# Patient Record
Sex: MALE | Race: OTHER | HISPANIC OR LATINO
[De-identification: names, ages, dates, MRNs, and addresses within clinical notes are randomized per-mention and may not be internally consistent; named-entity substitution may affect disease eponyms.]

---

## 2018-05-04 PROBLEM — Z00.00 ENCOUNTER FOR PREVENTIVE HEALTH EXAMINATION: Status: ACTIVE | Noted: 2018-05-04

## 2018-05-21 ENCOUNTER — TRANSCRIPTION ENCOUNTER (OUTPATIENT)
Age: 53
End: 2018-05-21

## 2018-05-21 ENCOUNTER — APPOINTMENT (OUTPATIENT)
Dept: ORTHOPEDIC SURGERY | Facility: CLINIC | Age: 53
End: 2018-05-21
Payer: COMMERCIAL

## 2018-05-21 VITALS — WEIGHT: 285 LBS | BODY MASS INDEX: 36.57 KG/M2 | HEIGHT: 74 IN

## 2018-05-21 DIAGNOSIS — Z87.891 PERSONAL HISTORY OF NICOTINE DEPENDENCE: ICD-10-CM

## 2018-05-21 DIAGNOSIS — M25.561 PAIN IN RIGHT KNEE: ICD-10-CM

## 2018-05-21 DIAGNOSIS — M21.162 VARUS DEFORMITY, NOT ELSEWHERE CLASSIFIED, LEFT KNEE: ICD-10-CM

## 2018-05-21 DIAGNOSIS — Z82.61 FAMILY HISTORY OF ARTHRITIS: ICD-10-CM

## 2018-05-21 DIAGNOSIS — Z86.79 PERSONAL HISTORY OF OTHER DISEASES OF THE CIRCULATORY SYSTEM: ICD-10-CM

## 2018-05-21 DIAGNOSIS — M25.562 PAIN IN RIGHT KNEE: ICD-10-CM

## 2018-05-21 DIAGNOSIS — Z80.9 FAMILY HISTORY OF MALIGNANT NEOPLASM, UNSPECIFIED: ICD-10-CM

## 2018-05-21 DIAGNOSIS — Z78.9 OTHER SPECIFIED HEALTH STATUS: ICD-10-CM

## 2018-05-21 DIAGNOSIS — M21.161 VARUS DEFORMITY, NOT ELSEWHERE CLASSIFIED, RIGHT KNEE: ICD-10-CM

## 2018-05-21 DIAGNOSIS — Z82.62 FAMILY HISTORY OF OSTEOPOROSIS: ICD-10-CM

## 2018-05-21 PROCEDURE — 99204 OFFICE O/P NEW MOD 45 MIN: CPT

## 2018-05-21 PROCEDURE — 73564 X-RAY EXAM KNEE 4 OR MORE: CPT | Mod: RT

## 2018-05-21 RX ORDER — TADALAFIL 5 MG/1
5 TABLET, FILM COATED ORAL
Refills: 0 | Status: ACTIVE | COMMUNITY

## 2018-05-21 RX ORDER — OMEPRAZOLE 10 MG/1
10 CAPSULE, DELAYED RELEASE ORAL
Refills: 0 | Status: ACTIVE | COMMUNITY

## 2018-05-21 RX ORDER — MELOXICAM 15 MG/1
15 TABLET ORAL
Refills: 0 | Status: ACTIVE | COMMUNITY

## 2018-05-21 RX ORDER — MV-MIN/FOLIC/K1/LYCOPEN/LUTEIN 300-60 MCG
TABLET ORAL
Refills: 0 | Status: ACTIVE | COMMUNITY

## 2018-05-21 RX ORDER — LISINOPRIL 10 MG/1
10 TABLET ORAL
Refills: 0 | Status: ACTIVE | COMMUNITY

## 2018-06-08 ENCOUNTER — RX RENEWAL (OUTPATIENT)
Age: 53
End: 2018-06-08

## 2018-06-08 RX ORDER — CELECOXIB 200 MG/1
200 CAPSULE ORAL
Qty: 2 | Refills: 0 | Status: ACTIVE | COMMUNITY
Start: 2018-06-08 | End: 1900-01-01

## 2018-06-21 ENCOUNTER — OUTPATIENT (OUTPATIENT)
Dept: OUTPATIENT SERVICES | Facility: HOSPITAL | Age: 53
LOS: 1 days | End: 2018-06-21
Payer: COMMERCIAL

## 2018-06-21 DIAGNOSIS — Z22.321 CARRIER OR SUSPECTED CARRIER OF METHICILLIN SUSCEPTIBLE STAPHYLOCOCCUS AUREUS: ICD-10-CM

## 2018-06-21 PROCEDURE — 87641 MR-STAPH DNA AMP PROBE: CPT

## 2018-06-22 LAB
MRSA PCR RESULT.: POSITIVE
S AUREUS DNA NOSE QL NAA+PROBE: POSITIVE

## 2018-06-27 ENCOUNTER — OUTPATIENT (OUTPATIENT)
Dept: OUTPATIENT SERVICES | Facility: HOSPITAL | Age: 53
LOS: 1 days | End: 2018-06-27
Payer: COMMERCIAL

## 2018-06-27 ENCOUNTER — APPOINTMENT (OUTPATIENT)
Dept: SURGERY | Facility: CLINIC | Age: 53
End: 2018-06-27

## 2018-06-27 VITALS
HEIGHT: 74 IN | HEART RATE: 76 BPM | OXYGEN SATURATION: 98 % | DIASTOLIC BLOOD PRESSURE: 95 MMHG | SYSTOLIC BLOOD PRESSURE: 138 MMHG | WEIGHT: 295.86 LBS | TEMPERATURE: 97 F | RESPIRATION RATE: 16 BRPM

## 2018-06-27 DIAGNOSIS — M21.161 VARUS DEFORMITY, NOT ELSEWHERE CLASSIFIED, RIGHT KNEE: ICD-10-CM

## 2018-06-27 DIAGNOSIS — M17.10 UNILATERAL PRIMARY OSTEOARTHRITIS, UNSPECIFIED KNEE: ICD-10-CM

## 2018-06-27 DIAGNOSIS — M21.162 VARUS DEFORMITY, NOT ELSEWHERE CLASSIFIED, LEFT KNEE: ICD-10-CM

## 2018-06-27 DIAGNOSIS — Z01.818 ENCOUNTER FOR OTHER PREPROCEDURAL EXAMINATION: ICD-10-CM

## 2018-06-27 DIAGNOSIS — K08.409 PARTIAL LOSS OF TEETH, UNSPECIFIED CAUSE, UNSPECIFIED CLASS: Chronic | ICD-10-CM

## 2018-06-27 DIAGNOSIS — Z98.890 OTHER SPECIFIED POSTPROCEDURAL STATES: Chronic | ICD-10-CM

## 2018-06-27 LAB
ALBUMIN SERPL ELPH-MCNC: 4.7 G/DL — SIGNIFICANT CHANGE UP (ref 3.3–5)
ALP SERPL-CCNC: 68 U/L — SIGNIFICANT CHANGE UP (ref 40–120)
ALT FLD-CCNC: 25 U/L — SIGNIFICANT CHANGE UP (ref 10–45)
ANION GAP SERPL CALC-SCNC: 10 MMOL/L — SIGNIFICANT CHANGE UP (ref 5–17)
APPEARANCE UR: CLEAR — SIGNIFICANT CHANGE UP
APTT BLD: 32.4 SEC — SIGNIFICANT CHANGE UP (ref 27.5–37.4)
AST SERPL-CCNC: 20 U/L — SIGNIFICANT CHANGE UP (ref 10–40)
BILIRUB SERPL-MCNC: 0.4 MG/DL — SIGNIFICANT CHANGE UP (ref 0.2–1.2)
BILIRUB UR-MCNC: NEGATIVE — SIGNIFICANT CHANGE UP
BUN SERPL-MCNC: 18 MG/DL — SIGNIFICANT CHANGE UP (ref 7–23)
CALCIUM SERPL-MCNC: 10.2 MG/DL — SIGNIFICANT CHANGE UP (ref 8.4–10.5)
CHLORIDE SERPL-SCNC: 101 MMOL/L — SIGNIFICANT CHANGE UP (ref 96–108)
CO2 SERPL-SCNC: 25 MMOL/L — SIGNIFICANT CHANGE UP (ref 22–31)
COLOR SPEC: YELLOW — SIGNIFICANT CHANGE UP
CREAT SERPL-MCNC: 0.85 MG/DL — SIGNIFICANT CHANGE UP (ref 0.5–1.3)
DIFF PNL FLD: NEGATIVE — SIGNIFICANT CHANGE UP
GLUCOSE SERPL-MCNC: 82 MG/DL — SIGNIFICANT CHANGE UP (ref 70–99)
GLUCOSE UR QL: NEGATIVE — SIGNIFICANT CHANGE UP
HCT VFR BLD CALC: 43.9 % — SIGNIFICANT CHANGE UP (ref 39–50)
HGB BLD-MCNC: 14.3 G/DL — SIGNIFICANT CHANGE UP (ref 13–17)
INR BLD: 1 — SIGNIFICANT CHANGE UP (ref 0.88–1.16)
KETONES UR-MCNC: NEGATIVE — SIGNIFICANT CHANGE UP
LEUKOCYTE ESTERASE UR-ACNC: NEGATIVE — SIGNIFICANT CHANGE UP
MCHC RBC-ENTMCNC: 27.4 PG — SIGNIFICANT CHANGE UP (ref 27–34)
MCHC RBC-ENTMCNC: 32.6 G/DL — SIGNIFICANT CHANGE UP (ref 32–36)
MCV RBC AUTO: 84.1 FL — SIGNIFICANT CHANGE UP (ref 80–100)
NITRITE UR-MCNC: NEGATIVE — SIGNIFICANT CHANGE UP
PH UR: 5.5 — SIGNIFICANT CHANGE UP (ref 5–8)
PLATELET # BLD AUTO: 255 K/UL — SIGNIFICANT CHANGE UP (ref 150–400)
POTASSIUM SERPL-MCNC: 4.5 MMOL/L — SIGNIFICANT CHANGE UP (ref 3.5–5.3)
POTASSIUM SERPL-SCNC: 4.5 MMOL/L — SIGNIFICANT CHANGE UP (ref 3.5–5.3)
PROT SERPL-MCNC: 7.7 G/DL — SIGNIFICANT CHANGE UP (ref 6–8.3)
PROT UR-MCNC: NEGATIVE MG/DL — SIGNIFICANT CHANGE UP
PROTHROM AB SERPL-ACNC: 11.1 SEC — SIGNIFICANT CHANGE UP (ref 9.8–12.7)
RBC # BLD: 5.22 M/UL — SIGNIFICANT CHANGE UP (ref 4.2–5.8)
RBC # FLD: 15.5 % — SIGNIFICANT CHANGE UP (ref 10.3–16.9)
SODIUM SERPL-SCNC: 136 MMOL/L — SIGNIFICANT CHANGE UP (ref 135–145)
SP GR SPEC: 1.02 — SIGNIFICANT CHANGE UP (ref 1–1.03)
UROBILINOGEN FLD QL: 0.2 E.U./DL — SIGNIFICANT CHANGE UP
WBC # BLD: 6.4 K/UL — SIGNIFICANT CHANGE UP (ref 3.8–10.5)
WBC # FLD AUTO: 6.4 K/UL — SIGNIFICANT CHANGE UP (ref 3.8–10.5)

## 2018-06-27 PROCEDURE — 93010 ELECTROCARDIOGRAM REPORT: CPT

## 2018-06-27 PROCEDURE — 71046 X-RAY EXAM CHEST 2 VIEWS: CPT | Mod: 26

## 2018-06-27 NOTE — H&P PST ADULT - PSH
History of elbow surgery  right  History of inguinal hernia repair, bilateral  In childhood  [place] teeth extracted

## 2018-06-27 NOTE — H&P PST ADULT - HISTORY OF PRESENT ILLNESS
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  Reports pain and swelling has gotten worse recently. Has had to resort to higher doses of NSAIDs  with resultant arterial hypertension.

## 2018-06-27 NOTE — H&P PST ADULT - FAMILY HISTORY
Sibling  Still living? Yes, Estimated age: Age Unknown  Type 2 diabetes mellitus, Age at diagnosis: Age Unknown     Mother  Still living? Yes, Estimated age: Age Unknown  Hypercholesterolemia, Age at diagnosis: Age Unknown

## 2018-06-27 NOTE — ASU PATIENT PROFILE, ADULT - PSH
History of elbow surgery  right  History of inguinal hernia repair, bilateral  In childhood  [place] teeth extracted

## 2018-06-28 LAB
CULTURE RESULTS: NO GROWTH — SIGNIFICANT CHANGE UP
SPECIMEN SOURCE: SIGNIFICANT CHANGE UP

## 2018-07-08 RX ORDER — ACETAMINOPHEN 500 MG
975 TABLET ORAL EVERY 8 HOURS
Qty: 0 | Refills: 0 | Status: DISCONTINUED | OUTPATIENT
Start: 2018-07-10 | End: 2018-07-13

## 2018-07-08 RX ORDER — SODIUM CHLORIDE 9 MG/ML
1000 INJECTION, SOLUTION INTRAVENOUS
Qty: 0 | Refills: 0 | Status: DISCONTINUED | OUTPATIENT
Start: 2018-07-10 | End: 2018-07-12

## 2018-07-08 RX ORDER — SENNA PLUS 8.6 MG/1
2 TABLET ORAL AT BEDTIME
Qty: 0 | Refills: 0 | Status: DISCONTINUED | OUTPATIENT
Start: 2018-07-10 | End: 2018-07-11

## 2018-07-08 RX ORDER — OXYCODONE HYDROCHLORIDE 5 MG/1
10 TABLET ORAL EVERY 4 HOURS
Qty: 0 | Refills: 0 | Status: DISCONTINUED | OUTPATIENT
Start: 2018-07-10 | End: 2018-07-13

## 2018-07-08 RX ORDER — ENOXAPARIN SODIUM 100 MG/ML
40 INJECTION SUBCUTANEOUS EVERY 24 HOURS
Qty: 0 | Refills: 0 | Status: DISCONTINUED | OUTPATIENT
Start: 2018-07-11 | End: 2018-07-12

## 2018-07-08 RX ORDER — ONDANSETRON 8 MG/1
4 TABLET, FILM COATED ORAL EVERY 6 HOURS
Qty: 0 | Refills: 0 | Status: DISCONTINUED | OUTPATIENT
Start: 2018-07-10 | End: 2018-07-13

## 2018-07-08 RX ORDER — OXYCODONE HYDROCHLORIDE 5 MG/1
5 TABLET ORAL EVERY 4 HOURS
Qty: 0 | Refills: 0 | Status: DISCONTINUED | OUTPATIENT
Start: 2018-07-10 | End: 2018-07-13

## 2018-07-08 RX ORDER — MAGNESIUM HYDROXIDE 400 MG/1
30 TABLET, CHEWABLE ORAL DAILY
Qty: 0 | Refills: 0 | Status: DISCONTINUED | OUTPATIENT
Start: 2018-07-10 | End: 2018-07-13

## 2018-07-08 RX ORDER — HYDROMORPHONE HYDROCHLORIDE 2 MG/ML
0.5 INJECTION INTRAMUSCULAR; INTRAVENOUS; SUBCUTANEOUS EVERY 4 HOURS
Qty: 0 | Refills: 0 | Status: DISCONTINUED | OUTPATIENT
Start: 2018-07-10 | End: 2018-07-12

## 2018-07-08 RX ORDER — DOCUSATE SODIUM 100 MG
100 CAPSULE ORAL THREE TIMES A DAY
Qty: 0 | Refills: 0 | Status: DISCONTINUED | OUTPATIENT
Start: 2018-07-10 | End: 2018-07-13

## 2018-07-08 RX ORDER — POLYETHYLENE GLYCOL 3350 17 G/17G
17 POWDER, FOR SOLUTION ORAL DAILY
Qty: 0 | Refills: 0 | Status: DISCONTINUED | OUTPATIENT
Start: 2018-07-10 | End: 2018-07-13

## 2018-07-08 NOTE — H&P ADULT - NSHPPHYSICALEXAM_GEN_ALL_CORE
Left Knee 5 degrees varus , medial joint line tenderness, Right knee 5 degrees varus, medial joint line tenderness.

## 2018-07-08 NOTE — H&P ADULT - PSH
History of elbow surgery  right  History of inguinal hernia repair, bilateral  In childhood  [place] teeth extracted

## 2018-07-09 VITALS
SYSTOLIC BLOOD PRESSURE: 155 MMHG | OXYGEN SATURATION: 98 % | HEIGHT: 74 IN | DIASTOLIC BLOOD PRESSURE: 87 MMHG | TEMPERATURE: 97 F | RESPIRATION RATE: 18 BRPM | HEART RATE: 85 BPM | WEIGHT: 286.6 LBS

## 2018-07-09 RX ORDER — AMLODIPINE BESYLATE 2.5 MG/1
5 TABLET ORAL DAILY
Qty: 0 | Refills: 0 | Status: DISCONTINUED | OUTPATIENT
Start: 2018-07-10 | End: 2018-07-12

## 2018-07-09 RX ORDER — HYDROMORPHONE HYDROCHLORIDE 2 MG/ML
0.5 INJECTION INTRAMUSCULAR; INTRAVENOUS; SUBCUTANEOUS
Qty: 0 | Refills: 0 | Status: DISCONTINUED | OUTPATIENT
Start: 2018-07-10 | End: 2018-07-10

## 2018-07-09 RX ORDER — OXYCODONE HYDROCHLORIDE 5 MG/1
20 TABLET ORAL EVERY 12 HOURS
Qty: 0 | Refills: 0 | Status: DISCONTINUED | OUTPATIENT
Start: 2018-07-10 | End: 2018-07-11

## 2018-07-09 RX ORDER — LISINOPRIL 2.5 MG/1
20 TABLET ORAL DAILY
Qty: 0 | Refills: 0 | Status: DISCONTINUED | OUTPATIENT
Start: 2018-07-10 | End: 2018-07-12

## 2018-07-09 RX ORDER — PANTOPRAZOLE SODIUM 20 MG/1
40 TABLET, DELAYED RELEASE ORAL
Qty: 0 | Refills: 0 | Status: DISCONTINUED | OUTPATIENT
Start: 2018-07-10 | End: 2018-07-12

## 2018-07-09 NOTE — PATIENT PROFILE ADULT. - PSH
History of elbow surgery  right  History of inguinal hernia repair, bilateral  In childhood  [place] teeth extracted

## 2018-07-10 ENCOUNTER — APPOINTMENT (OUTPATIENT)
Dept: ORTHOPEDIC SURGERY | Facility: HOSPITAL | Age: 53
End: 2018-07-10

## 2018-07-10 ENCOUNTER — TRANSCRIPTION ENCOUNTER (OUTPATIENT)
Age: 53
End: 2018-07-10

## 2018-07-10 ENCOUNTER — INPATIENT (INPATIENT)
Facility: HOSPITAL | Age: 53
LOS: 6 days | Discharge: NURSING, REHAB OR LONG-TERM CARE | DRG: 461 | End: 2018-07-17
Attending: ORTHOPAEDIC SURGERY | Admitting: ORTHOPAEDIC SURGERY
Payer: COMMERCIAL

## 2018-07-10 ENCOUNTER — RESULT REVIEW (OUTPATIENT)
Age: 53
End: 2018-07-10

## 2018-07-10 DIAGNOSIS — M17.0 BILATERAL PRIMARY OSTEOARTHRITIS OF KNEE: ICD-10-CM

## 2018-07-10 DIAGNOSIS — K44.9 DIAPHRAGMATIC HERNIA WITHOUT OBSTRUCTION OR GANGRENE: ICD-10-CM

## 2018-07-10 DIAGNOSIS — M21.161 VARUS DEFORMITY, NOT ELSEWHERE CLASSIFIED, RIGHT KNEE: ICD-10-CM

## 2018-07-10 DIAGNOSIS — Z98.890 OTHER SPECIFIED POSTPROCEDURAL STATES: Chronic | ICD-10-CM

## 2018-07-10 DIAGNOSIS — K21.9 GASTRO-ESOPHAGEAL REFLUX DISEASE WITHOUT ESOPHAGITIS: ICD-10-CM

## 2018-07-10 DIAGNOSIS — E78.01 FAMILIAL HYPERCHOLESTEROLEMIA: ICD-10-CM

## 2018-07-10 DIAGNOSIS — K08.409 PARTIAL LOSS OF TEETH, UNSPECIFIED CAUSE, UNSPECIFIED CLASS: Chronic | ICD-10-CM

## 2018-07-10 DIAGNOSIS — F41.1 GENERALIZED ANXIETY DISORDER: ICD-10-CM

## 2018-07-10 DIAGNOSIS — I10 ESSENTIAL (PRIMARY) HYPERTENSION: ICD-10-CM

## 2018-07-10 LAB
ANION GAP SERPL CALC-SCNC: 11 MMOL/L — SIGNIFICANT CHANGE UP (ref 5–17)
BASOPHILS NFR BLD AUTO: 0.4 % — SIGNIFICANT CHANGE UP (ref 0–2)
BUN SERPL-MCNC: 19 MG/DL — SIGNIFICANT CHANGE UP (ref 7–23)
CALCIUM SERPL-MCNC: 8.6 MG/DL — SIGNIFICANT CHANGE UP (ref 8.4–10.5)
CHLORIDE SERPL-SCNC: 105 MMOL/L — SIGNIFICANT CHANGE UP (ref 96–108)
CO2 SERPL-SCNC: 24 MMOL/L — SIGNIFICANT CHANGE UP (ref 22–31)
CREAT SERPL-MCNC: 0.78 MG/DL — SIGNIFICANT CHANGE UP (ref 0.5–1.3)
EOSINOPHIL NFR BLD AUTO: 0.4 % — SIGNIFICANT CHANGE UP (ref 0–6)
GLUCOSE SERPL-MCNC: 123 MG/DL — HIGH (ref 70–99)
HCT VFR BLD CALC: 39.2 % — SIGNIFICANT CHANGE UP (ref 39–50)
HGB BLD-MCNC: 12.5 G/DL — LOW (ref 13–17)
LYMPHOCYTES # BLD AUTO: 9.7 % — LOW (ref 13–44)
MCHC RBC-ENTMCNC: 27.6 PG — SIGNIFICANT CHANGE UP (ref 27–34)
MCHC RBC-ENTMCNC: 31.9 G/DL — LOW (ref 32–36)
MCV RBC AUTO: 86.5 FL — SIGNIFICANT CHANGE UP (ref 80–100)
MONOCYTES NFR BLD AUTO: 1.6 % — LOW (ref 2–14)
NEUTROPHILS NFR BLD AUTO: 87.9 % — HIGH (ref 43–77)
PLATELET # BLD AUTO: 214 K/UL — SIGNIFICANT CHANGE UP (ref 150–400)
POTASSIUM SERPL-MCNC: 4.6 MMOL/L — SIGNIFICANT CHANGE UP (ref 3.5–5.3)
POTASSIUM SERPL-SCNC: 4.6 MMOL/L — SIGNIFICANT CHANGE UP (ref 3.5–5.3)
RBC # BLD: 4.53 M/UL — SIGNIFICANT CHANGE UP (ref 4.2–5.8)
RBC # FLD: 15.2 % — SIGNIFICANT CHANGE UP (ref 10.3–16.9)
SODIUM SERPL-SCNC: 140 MMOL/L — SIGNIFICANT CHANGE UP (ref 135–145)
WBC # BLD: 7.5 K/UL — SIGNIFICANT CHANGE UP (ref 3.8–10.5)
WBC # FLD AUTO: 7.5 K/UL — SIGNIFICANT CHANGE UP (ref 3.8–10.5)

## 2018-07-10 PROCEDURE — 73560 X-RAY EXAM OF KNEE 1 OR 2: CPT | Mod: 26,50

## 2018-07-10 PROCEDURE — 27447 TOTAL KNEE ARTHROPLASTY: CPT | Mod: 50

## 2018-07-10 RX ORDER — ZINC SULFATE TAB 220 MG (50 MG ZINC EQUIVALENT) 220 (50 ZN) MG
15 TAB ORAL
Qty: 0 | Refills: 0 | COMMUNITY

## 2018-07-10 RX ORDER — LISINOPRIL 2.5 MG/1
1 TABLET ORAL
Qty: 0 | Refills: 0 | COMMUNITY

## 2018-07-10 RX ORDER — MULTIVIT-MIN/FERROUS GLUCONATE 9 MG/15 ML
1 LIQUID (ML) ORAL
Qty: 0 | Refills: 0 | COMMUNITY

## 2018-07-10 RX ORDER — CEFAZOLIN SODIUM 1 G
2000 VIAL (EA) INJECTION EVERY 8 HOURS
Qty: 0 | Refills: 0 | Status: COMPLETED | OUTPATIENT
Start: 2018-07-10 | End: 2018-07-11

## 2018-07-10 RX ORDER — BUPIVACAINE 13.3 MG/ML
20 INJECTION, SUSPENSION, LIPOSOMAL INFILTRATION ONCE
Qty: 0 | Refills: 0 | Status: DISCONTINUED | OUTPATIENT
Start: 2018-07-10 | End: 2018-07-17

## 2018-07-10 RX ORDER — CHOLECALCIFEROL (VITAMIN D3) 125 MCG
1 CAPSULE ORAL
Qty: 0 | Refills: 0 | COMMUNITY

## 2018-07-10 RX ORDER — AMLODIPINE BESYLATE 2.5 MG/1
1 TABLET ORAL
Qty: 0 | Refills: 0 | COMMUNITY

## 2018-07-10 RX ADMIN — PANTOPRAZOLE SODIUM 40 MILLIGRAM(S): 20 TABLET, DELAYED RELEASE ORAL at 21:11

## 2018-07-10 RX ADMIN — HYDROMORPHONE HYDROCHLORIDE 0.5 MILLIGRAM(S): 2 INJECTION INTRAMUSCULAR; INTRAVENOUS; SUBCUTANEOUS at 17:30

## 2018-07-10 RX ADMIN — Medication 100 MILLIGRAM(S): at 23:09

## 2018-07-10 RX ADMIN — HYDROMORPHONE HYDROCHLORIDE 0.5 MILLIGRAM(S): 2 INJECTION INTRAMUSCULAR; INTRAVENOUS; SUBCUTANEOUS at 21:00

## 2018-07-10 RX ADMIN — Medication 975 MILLIGRAM(S): at 23:09

## 2018-07-10 RX ADMIN — HYDROMORPHONE HYDROCHLORIDE 0.5 MILLIGRAM(S): 2 INJECTION INTRAMUSCULAR; INTRAVENOUS; SUBCUTANEOUS at 19:09

## 2018-07-10 RX ADMIN — OXYCODONE HYDROCHLORIDE 20 MILLIGRAM(S): 5 TABLET ORAL at 18:41

## 2018-07-10 RX ADMIN — HYDROMORPHONE HYDROCHLORIDE 0.5 MILLIGRAM(S): 2 INJECTION INTRAMUSCULAR; INTRAVENOUS; SUBCUTANEOUS at 19:24

## 2018-07-10 RX ADMIN — HYDROMORPHONE HYDROCHLORIDE 0.5 MILLIGRAM(S): 2 INJECTION INTRAMUSCULAR; INTRAVENOUS; SUBCUTANEOUS at 18:05

## 2018-07-10 RX ADMIN — HYDROMORPHONE HYDROCHLORIDE 0.5 MILLIGRAM(S): 2 INJECTION INTRAMUSCULAR; INTRAVENOUS; SUBCUTANEOUS at 17:50

## 2018-07-10 RX ADMIN — OXYCODONE HYDROCHLORIDE 20 MILLIGRAM(S): 5 TABLET ORAL at 19:26

## 2018-07-10 RX ADMIN — HYDROMORPHONE HYDROCHLORIDE 0.5 MILLIGRAM(S): 2 INJECTION INTRAMUSCULAR; INTRAVENOUS; SUBCUTANEOUS at 17:15

## 2018-07-10 RX ADMIN — Medication 975 MILLIGRAM(S): at 21:11

## 2018-07-10 RX ADMIN — Medication 100 MILLIGRAM(S): at 18:30

## 2018-07-10 RX ADMIN — HYDROMORPHONE HYDROCHLORIDE 0.5 MILLIGRAM(S): 2 INJECTION INTRAMUSCULAR; INTRAVENOUS; SUBCUTANEOUS at 21:10

## 2018-07-10 NOTE — PHYSICAL THERAPY INITIAL EVALUATION ADULT - IMPAIRED TRANSFERS: SIT/STAND, REHAB EVAL
pain/impaired postural control/impaired balance/impaired motor control/decreased strength/decreased ROM/impaired sensory feedback

## 2018-07-10 NOTE — CONSULT NOTE ADULT - SUBJECTIVE AND OBJECTIVE BOX
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  Reports moderate pain and swelling, decreased ROM and unsteady gait has gotten worse recently. Has had to resort to higher doses of NSAIDs  with resultant arterial hypertension.  X rays confirm diagnosis.  Symptoms worse with stairs and after prolonged immobility and increased over time.    PAST MEDICAL & SURGICAL HISTORY:  Hiatal hernia GERD  HBP hypercholesterolemia   Arthritis: both knees  [place] teeth extracted  History of inguinal hernia repair, bilateral: In childhood  History of elbow surgery: right      REVIEW OF SYSTEMS    General:  normal  Skin/Breast: normal  Ophthalmologic: negative  ENMT:	normal  Respiratory and Thorax: normal  Cardiovascular:	normal  Gastrointestinal:	normal  Genitourinary:	normal  Musculoskeletal:	 bilateral knee swelling   Neurological:	normal  Psychiatric:	normal  Hematology/Lymphatics:	 negative  Endocrine:	negative  Allergic/Immunologic:	negative      MEDICATIONS     lisinopril 20 mg oral tablet: Last Dose Taken:  , 1 tab(s) orally once a day  · 	omeprazole 20 mg oral delayed release capsule: Last Dose Taken:  , 1 cap(s) orally once a day  · 	amLODIPine 5 mg oral tablet: Last Dose Taken:  , 1 tab(s) orally once a day  · 	traMADol 50 mg oral tablet: 1 tab(s) orally every 4 hours  · 	Tylenol 500 mg oral tablet: 2 tab(s) orally every 6 hours  · 	Ferrous DS: Last Dose Taken:  , 65  orally 2 times a day  · 	Centrum Silver oral tablet: Last Dose Taken:  , 1 tab(s) orally once a day  · 	magnesium hydroxide 400 mg oral tablet, chewable: Last Dose Taken:    · 	Calcium 500+D: Last Dose Taken:  , 1000 milligram(s) orally once a day  · 	Zinc: 15 milligram(s) orally once a day  · 	Vitamin D3 2000 intl units oral tablet: 1 tab(s) orally once a day    Allergies    No Known Allergies    SOCIAL HISTORY: no cigs social alcohol    FAMILY HISTORY:  Hypercholesterolemia (Mother): Mother.  Type 2 diabetes mellitus (Sibling): Younger brother. Still living.      PHYSICAL EXAM:    Vital Signs Last 24 Hrs  T(C): --  T(F): --98.6  HR: --72  BP: --120/80  BP(mean): --  RR: --16  SpO2: --    Constitutional: WDWNWM  Eyes: conj pink  ENMT: negative  Neck: supple  Breasts: not examined   Back: negative  Respiratory: clear to P&A  Cardiovascular: no MRGT or H  Gastrointestinal: normal bowel sounds  Genitourinary: neg  Rectal: not examined  Extremities: normal  Vascular: normal  Neurological: normal  Skin: negative  Lymph Nodes: negative  Musculoskeletal:   decreased ROM  bilateral knees  Psychiatric: anxiety

## 2018-07-10 NOTE — PHYSICAL THERAPY INITIAL EVALUATION ADULT - TRANSFER SAFETY CONCERNS NOTED: SIT/STAND, REHAB EVAL
decreased proprioception/losing balance/inability to maintain weight-bearing restrictions w/o assist

## 2018-07-10 NOTE — BRIEF OPERATIVE NOTE - PROCEDURE
<<-----Click on this checkbox to enter Procedure Total knee replacements  07/10/2018  ANJU  Active  AV

## 2018-07-10 NOTE — PHYSICAL THERAPY INITIAL EVALUATION ADULT - ACTIVE RANGE OF MOTION EXAMINATION, REHAB EVAL
bilateral upper extremity Active ROM was WFL (within functional limits)/b/l knees between 0-50 degrees

## 2018-07-10 NOTE — DISCHARGE NOTE ADULT - MEDICATION SUMMARY - MEDICATIONS TO TAKE
I will START or STAY ON the medications listed below when I get home from the hospital:    oxyCODONE 10 mg oral tablet  -- 1 tab(s) by mouth every 3 hours, As needed, Severe Pain (7 - 10)  -- Indication: For severe postop pain    oxyCODONE 5 mg oral tablet  -- 1 tab(s) by mouth every 3 hours, As needed, Moderate Pain (4 - 6)  -- Indication: For Moderate postop pain    acetaminophen 325 mg oral tablet  -- 3 tab(s) by mouth every 8 hours  -- Indication: For Postop pain    lisinopril 20 mg oral tablet  -- 1 tab(s) by mouth once a day  -- Indication: For HTN     enoxaparin  -- 40 mg subcutaneous daily x 3 weeks from date of surgery  -- Indication: For clot prevention    amLODIPine 5 mg oral tablet  -- 1 tab(s) by mouth once a day  -- Indication: For HTN    Ferrous DS  -- 65  by mouth 2 times a day  -- Indication: For Iron supplement    bisacodyl 10 mg rectal suppository  -- 1 suppository(ies) rectally once a day, As needed, Constipation  -- Indication: For Constipation    Zinc  -- 15 milligram(s) by mouth once a day  -- Indication: For vitamin    sucralfate 1 g/10 mL oral suspension  -- 10 milliliter(s) by mouth 4 times a day  -- Indication: For Esophagitis, jennie henry tear, hiatal hernia    pantoprazole 40 mg oral delayed release tablet  -- 1 tab(s) by mouth once a day (before a meal)  -- Indication: For Esophagitis, jennie henry tear, hiatal hernia    Centrum Silver oral tablet  -- 1 tab(s) by mouth once a day  -- Indication: For vitamin    Calcium 500+D  -- 1000 milligram(s) by mouth once a day  -- Indication: For vitamin    Vitamin D3 2000 intl units oral tablet  -- 1 tab(s) by mouth once a day  -- Indication: For vitamin

## 2018-07-10 NOTE — PHYSICAL THERAPY INITIAL EVALUATION ADULT - IMPAIRMENTS CONTRIBUTING TO GAIT DEVIATIONS, PT EVAL
impaired motor control/pain/impaired postural control/impaired balance/decreased ROM/decreased strength/impaired sensory feedback

## 2018-07-10 NOTE — PHYSICAL THERAPY INITIAL EVALUATION ADULT - GAIT DEVIATIONS NOTED, PT EVAL
increased time in double stance/decreased josselyn/decreased step length/decreased weight-shifting ability

## 2018-07-10 NOTE — PHYSICAL THERAPY INITIAL EVALUATION ADULT - GENERAL OBSERVATIONS, REHAB EVAL
Patient received in semi-manuel position, no apparent distress, +intravenous line, +hemo vac x 2, +cryocuff x 2, +sequential pneumatic compression device x 2, +telemetry.

## 2018-07-10 NOTE — DISCHARGE NOTE ADULT - ADDITIONAL INSTRUCTIONS
No strenuous activity, heavy lifting, driving, tub bathing, or returning to work until cleared by MD.  You may shower--dressing is waterproof.  Remove dressing after post op day 10, then leave incision open to air.  Follow up with Dr. Rosas in his office in 3 weeks.  Any staples/sutures will be removed in his office.   If you don't have a bowel movement by post op day 3, then take Milk of Magnesia (over the counter).  If no bowel movement by at least post op day 5, then use a Dulcolax suppository (over the counter) and/or a Fleets enema--if still no bowel movement, call your MD.  Contact your doctor if you experience: fever greater than 101.5, chills, chest pain, difficulty breathing, bleeding, redness or heat around the incision.    Please follow up with your primary care provider. No strenuous activity, heavy lifting, driving, tub bathing, or returning to work until cleared by MD.  You may shower--dressing is waterproof.  Remove dressing after post op day 10, then leave incision open to air.  Follow up with Dr. Rosas in his office in 3 weeks.  Any staples/sutures will be removed in his office.   If you don't have a bowel movement by post op day 3, then take Milk of Magnesia (over the counter).  If no bowel movement by at least post op day 5, then use a Dulcolax suppository (over the counter) and/or a Fleets enema--if still no bowel movement, call your MD.  Contact your doctor if you experience: fever greater than 101.5, chills, chest pain, difficulty breathing, bleeding, redness or heat around the incision.    Please follow up with your primary care provider.  Please follow up with your gastroenterologist to discuss you recent EGD on 7/13/18.  It showed severe esophagitis, large hiatal hernia, and jennie henry tear.  Do not take NSAID (aleve, ibuprofen, naproxen, advil, etc).

## 2018-07-10 NOTE — PHYSICAL THERAPY INITIAL EVALUATION ADULT - CRITERIA FOR SKILLED THERAPEUTIC INTERVENTIONS
risk reduction/prevention/therapy frequency/anticipated discharge recommendation/rehab potential/impairments found/functional limitations in following categories

## 2018-07-10 NOTE — DISCHARGE NOTE ADULT - CARE PLAN
Principal Discharge DX:	Primary osteoarthritis of both knees  Goal:	improvement after surgery  Assessment and plan of treatment:	see below

## 2018-07-10 NOTE — DISCHARGE NOTE ADULT - CARE PROVIDER_API CALL
Danny Rosas (MD), Orthopaedic Surgery  [address]  [address] [place] [address]  Phone: [phone]  Fax: [phone]

## 2018-07-10 NOTE — PROGRESS NOTE ADULT - SUBJECTIVE AND OBJECTIVE BOX
Post op check      Pt seen and examined in PACU.  Pt comfortable.    PE:  aaox3  BL LE sensation intact  motor: BL LE still under effect of spinal  2+ DP pulse BL LE  BL LE warm well perfused    a/p s/p BLM TKA  WBAT  PT  ancef x 24 h  lovenox  reg diet  drain care and record

## 2018-07-10 NOTE — DISCHARGE NOTE ADULT - MEDICATION SUMMARY - MEDICATIONS TO STOP TAKING
I will STOP taking the medications listed below when I get home from the hospital:    omeprazole 20 mg oral delayed release capsule  -- 1 cap(s) by mouth once a day    traMADol 50 mg oral tablet  -- 1 tab(s) by mouth every 4 hours    magnesium hydroxide 400 mg oral tablet, chewable

## 2018-07-10 NOTE — DISCHARGE NOTE ADULT - PATIENT PORTAL LINK FT
You can access the [facility] Patient Portal, offered by [facility], by registering with the following website: [email or web address]

## 2018-07-10 NOTE — DISCHARGE NOTE ADULT - HOSPITAL COURSE
Admitted  Surgery - b/l TKA 7/10/18  Perioperative abx  Pain control  DVT ppx  Med consult  PT and OT consult Admitted  Surgery - b/l TKA 7/10/18  Perioperative abx  Pain control  DVT ppx  Med consult  PT and OT consult  Cardio consult  GI consult and EGD 7/13/18 -  large hiatal hernia, jennie henry tear, severe esophagitis  3 unit prbc tranfusion for acute blood loss 2/2 GI bleed

## 2018-07-11 LAB
ANION GAP SERPL CALC-SCNC: 13 MMOL/L — SIGNIFICANT CHANGE UP (ref 5–17)
ANION GAP SERPL CALC-SCNC: 13 MMOL/L — SIGNIFICANT CHANGE UP (ref 5–17)
APTT BLD: 25.6 SEC — LOW (ref 27.5–37.4)
BASOPHILS NFR BLD AUTO: 0.1 % — SIGNIFICANT CHANGE UP (ref 0–2)
BUN SERPL-MCNC: 28 MG/DL — HIGH (ref 7–23)
BUN SERPL-MCNC: 36 MG/DL — HIGH (ref 7–23)
CALCIUM SERPL-MCNC: 8.9 MG/DL — SIGNIFICANT CHANGE UP (ref 8.4–10.5)
CALCIUM SERPL-MCNC: 9.2 MG/DL — SIGNIFICANT CHANGE UP (ref 8.4–10.5)
CHLORIDE SERPL-SCNC: 100 MMOL/L — SIGNIFICANT CHANGE UP (ref 96–108)
CHLORIDE SERPL-SCNC: 101 MMOL/L — SIGNIFICANT CHANGE UP (ref 96–108)
CK MB CFR SERPL CALC: 2.3 NG/ML — SIGNIFICANT CHANGE UP (ref 0–6.7)
CK SERPL-CCNC: 104 U/L — SIGNIFICANT CHANGE UP (ref 30–200)
CO2 SERPL-SCNC: 24 MMOL/L — SIGNIFICANT CHANGE UP (ref 22–31)
CO2 SERPL-SCNC: 24 MMOL/L — SIGNIFICANT CHANGE UP (ref 22–31)
CREAT SERPL-MCNC: 0.8 MG/DL — SIGNIFICANT CHANGE UP (ref 0.5–1.3)
CREAT SERPL-MCNC: 0.93 MG/DL — SIGNIFICANT CHANGE UP (ref 0.5–1.3)
GLUCOSE BLDC GLUCOMTR-MCNC: 132 MG/DL — HIGH (ref 70–99)
GLUCOSE SERPL-MCNC: 122 MG/DL — HIGH (ref 70–99)
GLUCOSE SERPL-MCNC: 137 MG/DL — HIGH (ref 70–99)
HCT VFR BLD CALC: 35.4 % — LOW (ref 39–50)
HCT VFR BLD CALC: 36.6 % — LOW (ref 39–50)
HGB BLD-MCNC: 11.3 G/DL — LOW (ref 13–17)
HGB BLD-MCNC: 11.4 G/DL — LOW (ref 13–17)
INR BLD: 1.13 — SIGNIFICANT CHANGE UP (ref 0.88–1.16)
LACTATE SERPL-SCNC: 1.9 MMOL/L — SIGNIFICANT CHANGE UP (ref 0.5–2)
LACTATE SERPL-SCNC: 2.8 MMOL/L — HIGH (ref 0.5–2)
LYMPHOCYTES # BLD AUTO: 15.9 % — SIGNIFICANT CHANGE UP (ref 13–44)
MCHC RBC-ENTMCNC: 27.1 PG — SIGNIFICANT CHANGE UP (ref 27–34)
MCHC RBC-ENTMCNC: 27.7 PG — SIGNIFICANT CHANGE UP (ref 27–34)
MCHC RBC-ENTMCNC: 31.1 G/DL — LOW (ref 32–36)
MCHC RBC-ENTMCNC: 31.9 G/DL — LOW (ref 32–36)
MCV RBC AUTO: 86.8 FL — SIGNIFICANT CHANGE UP (ref 80–100)
MCV RBC AUTO: 86.9 FL — SIGNIFICANT CHANGE UP (ref 80–100)
MONOCYTES NFR BLD AUTO: 11.3 % — SIGNIFICANT CHANGE UP (ref 2–14)
NEUTROPHILS NFR BLD AUTO: 72.7 % — SIGNIFICANT CHANGE UP (ref 43–77)
PLATELET # BLD AUTO: 271 K/UL — SIGNIFICANT CHANGE UP (ref 150–400)
PLATELET # BLD AUTO: 321 K/UL — SIGNIFICANT CHANGE UP (ref 150–400)
POTASSIUM SERPL-MCNC: 4.6 MMOL/L — SIGNIFICANT CHANGE UP (ref 3.5–5.3)
POTASSIUM SERPL-MCNC: 4.7 MMOL/L — SIGNIFICANT CHANGE UP (ref 3.5–5.3)
POTASSIUM SERPL-SCNC: 4.6 MMOL/L — SIGNIFICANT CHANGE UP (ref 3.5–5.3)
POTASSIUM SERPL-SCNC: 4.7 MMOL/L — SIGNIFICANT CHANGE UP (ref 3.5–5.3)
PROTHROM AB SERPL-ACNC: 12.6 SEC — SIGNIFICANT CHANGE UP (ref 9.8–12.7)
RBC # BLD: 4.08 M/UL — LOW (ref 4.2–5.8)
RBC # BLD: 4.21 M/UL — SIGNIFICANT CHANGE UP (ref 4.2–5.8)
RBC # FLD: 15.1 % — SIGNIFICANT CHANGE UP (ref 10.3–16.9)
RBC # FLD: 15.4 % — SIGNIFICANT CHANGE UP (ref 10.3–16.9)
SODIUM SERPL-SCNC: 137 MMOL/L — SIGNIFICANT CHANGE UP (ref 135–145)
SODIUM SERPL-SCNC: 138 MMOL/L — SIGNIFICANT CHANGE UP (ref 135–145)
SURGICAL PATHOLOGY STUDY: SIGNIFICANT CHANGE UP
TROPONIN T SERPL-MCNC: <0.01 NG/ML — SIGNIFICANT CHANGE UP (ref 0–0.01)
WBC # BLD: 13 K/UL — HIGH (ref 3.8–10.5)
WBC # BLD: 16 K/UL — HIGH (ref 3.8–10.5)
WBC # FLD AUTO: 13 K/UL — HIGH (ref 3.8–10.5)
WBC # FLD AUTO: 16 K/UL — HIGH (ref 3.8–10.5)

## 2018-07-11 RX ORDER — SODIUM CHLORIDE 9 MG/ML
1000 INJECTION INTRAMUSCULAR; INTRAVENOUS; SUBCUTANEOUS ONCE
Qty: 0 | Refills: 0 | Status: COMPLETED | OUTPATIENT
Start: 2018-07-11 | End: 2018-07-11

## 2018-07-11 RX ORDER — OXYCODONE HYDROCHLORIDE 5 MG/1
10 TABLET ORAL EVERY 12 HOURS
Qty: 0 | Refills: 0 | Status: DISCONTINUED | OUTPATIENT
Start: 2018-07-11 | End: 2018-07-13

## 2018-07-11 RX ORDER — ACETAMINOPHEN 500 MG
1000 TABLET ORAL ONCE
Qty: 0 | Refills: 0 | Status: COMPLETED | OUTPATIENT
Start: 2018-07-11 | End: 2018-07-11

## 2018-07-11 RX ORDER — SENNA PLUS 8.6 MG/1
2 TABLET ORAL AT BEDTIME
Qty: 0 | Refills: 0 | Status: DISCONTINUED | OUTPATIENT
Start: 2018-07-11 | End: 2018-07-13

## 2018-07-11 RX ORDER — CELECOXIB 200 MG/1
200 CAPSULE ORAL ONCE
Qty: 0 | Refills: 0 | Status: COMPLETED | OUTPATIENT
Start: 2018-07-11 | End: 2018-07-11

## 2018-07-11 RX ADMIN — PANTOPRAZOLE SODIUM 40 MILLIGRAM(S): 20 TABLET, DELAYED RELEASE ORAL at 06:10

## 2018-07-11 RX ADMIN — Medication 400 MILLIGRAM(S): at 14:45

## 2018-07-11 RX ADMIN — OXYCODONE HYDROCHLORIDE 10 MILLIGRAM(S): 5 TABLET ORAL at 04:21

## 2018-07-11 RX ADMIN — Medication 100 MILLIGRAM(S): at 22:08

## 2018-07-11 RX ADMIN — Medication 975 MILLIGRAM(S): at 22:08

## 2018-07-11 RX ADMIN — Medication 100 MILLIGRAM(S): at 14:51

## 2018-07-11 RX ADMIN — OXYCODONE HYDROCHLORIDE 10 MILLIGRAM(S): 5 TABLET ORAL at 19:00

## 2018-07-11 RX ADMIN — CELECOXIB 200 MILLIGRAM(S): 200 CAPSULE ORAL at 09:50

## 2018-07-11 RX ADMIN — OXYCODONE HYDROCHLORIDE 20 MILLIGRAM(S): 5 TABLET ORAL at 06:10

## 2018-07-11 RX ADMIN — Medication 1000 MILLIGRAM(S): at 15:45

## 2018-07-11 RX ADMIN — Medication 975 MILLIGRAM(S): at 07:38

## 2018-07-11 RX ADMIN — ENOXAPARIN SODIUM 40 MILLIGRAM(S): 100 INJECTION SUBCUTANEOUS at 06:10

## 2018-07-11 RX ADMIN — AMLODIPINE BESYLATE 5 MILLIGRAM(S): 2.5 TABLET ORAL at 06:10

## 2018-07-11 RX ADMIN — SENNA PLUS 2 TABLET(S): 8.6 TABLET ORAL at 22:08

## 2018-07-11 RX ADMIN — Medication 100 MILLIGRAM(S): at 01:27

## 2018-07-11 RX ADMIN — HYDROMORPHONE HYDROCHLORIDE 0.5 MILLIGRAM(S): 2 INJECTION INTRAMUSCULAR; INTRAVENOUS; SUBCUTANEOUS at 02:15

## 2018-07-11 RX ADMIN — OXYCODONE HYDROCHLORIDE 20 MILLIGRAM(S): 5 TABLET ORAL at 07:39

## 2018-07-11 RX ADMIN — OXYCODONE HYDROCHLORIDE 10 MILLIGRAM(S): 5 TABLET ORAL at 22:08

## 2018-07-11 RX ADMIN — OXYCODONE HYDROCHLORIDE 10 MILLIGRAM(S): 5 TABLET ORAL at 18:07

## 2018-07-11 RX ADMIN — Medication 100 MILLIGRAM(S): at 06:10

## 2018-07-11 RX ADMIN — ONDANSETRON 4 MILLIGRAM(S): 8 TABLET, FILM COATED ORAL at 05:16

## 2018-07-11 RX ADMIN — CELECOXIB 200 MILLIGRAM(S): 200 CAPSULE ORAL at 10:50

## 2018-07-11 RX ADMIN — OXYCODONE HYDROCHLORIDE 10 MILLIGRAM(S): 5 TABLET ORAL at 09:50

## 2018-07-11 RX ADMIN — OXYCODONE HYDROCHLORIDE 10 MILLIGRAM(S): 5 TABLET ORAL at 06:02

## 2018-07-11 RX ADMIN — HYDROMORPHONE HYDROCHLORIDE 0.5 MILLIGRAM(S): 2 INJECTION INTRAMUSCULAR; INTRAVENOUS; SUBCUTANEOUS at 01:33

## 2018-07-11 RX ADMIN — SODIUM CHLORIDE 2000 MILLILITER(S): 9 INJECTION INTRAMUSCULAR; INTRAVENOUS; SUBCUTANEOUS at 13:30

## 2018-07-11 RX ADMIN — LISINOPRIL 20 MILLIGRAM(S): 2.5 TABLET ORAL at 06:10

## 2018-07-11 RX ADMIN — OXYCODONE HYDROCHLORIDE 10 MILLIGRAM(S): 5 TABLET ORAL at 23:00

## 2018-07-11 RX ADMIN — Medication 975 MILLIGRAM(S): at 06:10

## 2018-07-11 RX ADMIN — Medication 975 MILLIGRAM(S): at 23:00

## 2018-07-11 NOTE — PROGRESS NOTE ADULT - SUBJECTIVE AND OBJECTIVE BOX
Fellow Note   Patient seen and examined at bedside.      S: No acute events overnight.   Pain tolerable.  Some nausea this am, improved with meds.    Denies CP/SOB. Tolerating PO.  ROS unremarkable.    O: T(C): 36.4 (07-11-18 @ 05:02), Max: 36.6 (07-10-18 @ 14:44)  HR: 80 (07-11-18 @ 04:56) (58 - 97)  BP: 125/74 (07-11-18 @ 04:56) (113/72 - 165/94)  RR: 20 (07-11-18 @ 04:56) (12 - 21)  SpO2: 95% (07-11-18 @ 04:56) (95% - 98%)  Wt(kg): --    NAD, AOx3, non-labored respirations  B/L knee Dressing CDI  Extremity soft, appropriate swelling and minimally TTP  feet warm and well perfused  SILT dP, sP, Sa, Hinson, T  TA/EHL/GS motor intact                           12.5   7.5   )-----------( 214      ( 10 Jul 2018 15:19 )             39.2       I&O's Detail    10 Jul 2018 07:01  -  11 Jul 2018 07:00  --------------------------------------------------------  IN:    IV PiggyBack: 100 mL    lactated ringers.: 915 mL  Total IN: 1015 mL    OUT:    Accordian: 340 mL    Accordian: 335 mL    Voided: 1200 mL  Total OUT: 1875 mL    Total NET: -860 mL            A/P: 53y Male s/p B/L TKA POD#1    - analgesia with bowel regimen  - WBAT with PT  - OOB ad kayleigh  - DVT ppx: enoxaparin Injectable 40 milliGRAM(s) SubCutaneous every 24 hours  - ADAT  - f/u labs  - Dispo planning- home with PT

## 2018-07-11 NOTE — OCCUPATIONAL THERAPY INITIAL EVALUATION ADULT - GENERAL OBSERVATIONS, REHAB EVAL
Patient cleared for Occupational Therapy by KEENAN Fong post blood transfusion, patient received supine in semi-manuel position in non-acute distress, spouse present at bedside. +Tele, +IV heplock, + bilateral knee cryocuffs, + bilateral sequential compression devices. Patient reports bilateral knee pain rated 7/10, spiking to 10/10 with activity. Reports dizziness/nausea after sitting at EOB for 10 minutes, resolved after returned to supine, KEENAN Fong aware/present.

## 2018-07-11 NOTE — OCCUPATIONAL THERAPY INITIAL EVALUATION ADULT - LEVEL OF INDEPENDENCE: SCOOT/BRIDGE, REHAB EVAL
liftin bilateral lower extremities/moderate assist (50% patients effort) to assist lifting bilateral lower extremities/moderate assist (50% patients effort)

## 2018-07-11 NOTE — OCCUPATIONAL THERAPY INITIAL EVALUATION ADULT - LEVEL OF INDEPENDENCE: SIT/STAND, REHAB EVAL
unable to perform in context of significant bilateral knee pain, diaphoresis and c/o nausea; KEENAN Fong aware/present

## 2018-07-11 NOTE — PROGRESS NOTE ADULT - SUBJECTIVE AND OBJECTIVE BOX
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  Reports moderate pain and swelling, decreased ROM and unsteady gait has gotten worse recently. Has had to resort to higher doses of NSAIDs  with resultant arterial hypertension.  X rays confirm diagnosis.  Symptoms worse with stairs and after prolonged immobility and increased over time.  Patient day #1 post op bilateral TKR with moderate knee pain and malaise.    PAST MEDICAL & SURGICAL HISTORY:  Hiatal hernia  allergic rhinitis  Arthritis: both knees  [place] teeth extracted  History of inguinal hernia repair, bilateral: In childhood  History of elbow surgery: right      MEDICATIONS  (STANDING):  acetaminophen   Tablet. 975 milliGRAM(s) Oral every 8 hours  amLODIPine   Tablet 5 milliGRAM(s) Oral daily  BUpivacaine liposome 1.3% Injectable (no eMAR) 20 milliLiter(s) Local Injection once  docusate sodium 100 milliGRAM(s) Oral three times a day  enoxaparin Injectable 40 milliGRAM(s) SubCutaneous every 24 hours  lactated ringers. 1000 milliLiter(s) (70 mL/Hr) IV Continuous <Continuous>  lisinopril 20 milliGRAM(s) Oral daily  oxyCODONE  ER Tablet 20 milliGRAM(s) Oral every 12 hours  pantoprazole    Tablet 40 milliGRAM(s) Oral before breakfast  polyethylene glycol 3350 17 Gram(s) Oral daily    MEDICATIONS  (PRN):  aluminum hydroxide/magnesium hydroxide/simethicone Suspension 30 milliLiter(s) Oral four times a day PRN Indigestion  HYDROmorphone  Injectable 0.5 milliGRAM(s) IV Push every 4 hours PRN Severe Pain  magnesium hydroxide Suspension 30 milliLiter(s) Oral daily PRN Constipation  ondansetron Injectable 4 milliGRAM(s) IV Push every 6 hours PRN Nausea and/or Vomiting  oxyCODONE    IR 5 milliGRAM(s) Oral every 4 hours PRN Mild Pain  oxyCODONE    IR 10 milliGRAM(s) Oral every 4 hours PRN Moderate Pain  senna 2 Tablet(s) Oral at bedtime PRN Constipation      Allergies    No Known Allergies    REVIEW OF SYSTEMS    General:  malaise	  Skin/Breast: normal  Ophthalmologic: negative  ENMT:	normal  Respiratory and Thorax: normal  Cardiovascular:	normal  Gastrointestinal:	normal  Genitourinary:	normal  Musculoskeletal:	 bilateral knee swelling   Neurological:	normal  Psychiatric:	normal  Hematology/Lymphatics: negative  Endocrine:	negative  Allergic/Immunologic:	negative      PHYSICAL EXAM:     Vital Signs Last 24 Hrs  T(C): 36.4 (11 Jul 2018 05:02), Max: 36.6 (10 Jul 2018 14:44)  T(F): 97.6 (11 Jul 2018 05:02), Max: 97.9 (10 Jul 2018 14:44)  HR: 80 (11 Jul 2018 04:56) (58 - 97)  BP: 125/74 (11 Jul 2018 04:56) (113/72 - 165/94)  BP(mean): 92 (11 Jul 2018 04:56) (92 - 121)  RR: 20 (11 Jul 2018 04:56) (12 - 21)  SpO2: 95% (11 Jul 2018 04:56) (95% - 98%)    Constitutional: WDWNM  Eyes: conj pale  ENMT: negative  Neck: supple  Breasts: not examined   Back: negative  Respiratory: clear to P&A  Cardiovascular: no MRGT or H  Gastrointestinal: normal bowel sounds  Genitourinary: neg  Rectal: not examined  Extremities: normal  Vascular: normal  Neurological: normal  Skin: negative  Lymph Nodes: negative  Musculoskeletal:   decreased ROM  bilateral knees  Psychiatric: anxiety                        12.5   7.5   )-----------( 214      ( 10 Jul 2018 15:19 )             39.2       07-10    140  |  105  |  19  ----------------------------<  123<H>  4.6   |  24  |  0.78    Ca    8.6      10 Jul 2018 15:18

## 2018-07-11 NOTE — CONSULT NOTE ADULT - SUBJECTIVE AND OBJECTIVE BOX
CHIEF COMPLAINT: Near faint after ambulating to bathroom and attempting to defecate.    HISTORY OF PRESENT ILLNESS: History of bilateral knee pain. Reports he broke out in a cold sweat and sffered clouding of vision when in the bathroom. Staff was urgently called. He reports he feel well now. Never experienced chest pain.     PAST MEDICAL & SURGICAL HISTORY:  Hiatal hernia  Arthritis: both knees  [place] teeth extracted  History of inguinal hernia repair, bilateral: In childhood  History of elbow surgery: right      [ ] Diabetes   [x ] Hypertension  [ ] Hyperlipidemia  [ ] CAD  [ ] PCI  [ ] CABG    PREVIOUS DIAGNOSTIC TESTING:    [ ] Echocardiogram:  [ ]  Catheterization:  [ ] Stress Test:  	    MEDICATIONS:  amLODIPine   Tablet 5 milliGRAM(s) Oral daily  enoxaparin Injectable 40 milliGRAM(s) SubCutaneous every 24 hours  lisinopril 20 milliGRAM(s) Oral daily        acetaminophen   Tablet. 975 milliGRAM(s) Oral every 8 hours  HYDROmorphone  Injectable 0.5 milliGRAM(s) IV Push every 4 hours PRN  ondansetron Injectable 4 milliGRAM(s) IV Push every 6 hours PRN  oxyCODONE    IR 5 milliGRAM(s) Oral every 4 hours PRN  oxyCODONE    IR 10 milliGRAM(s) Oral every 4 hours PRN  oxyCODONE  ER Tablet 10 milliGRAM(s) Oral every 12 hours    aluminum hydroxide/magnesium hydroxide/simethicone Suspension 30 milliLiter(s) Oral four times a day PRN  docusate sodium 100 milliGRAM(s) Oral three times a day  magnesium hydroxide Suspension 30 milliLiter(s) Oral daily PRN  pantoprazole    Tablet 40 milliGRAM(s) Oral before breakfast  polyethylene glycol 3350 17 Gram(s) Oral daily  senna 2 Tablet(s) Oral at bedtime      lactated ringers. 1000 milliLiter(s) IV Continuous <Continuous>      FAMILY HISTORY:  Hypercholesterolemia (Mother): Mother.  Type 2 diabetes mellitus (Sibling): Younger brother. Still living.      SOCIAL HISTORY:    [ ] Non-smoker  [ ] Smoker  [ ] Alcohol    Allergies    No Known Allergies    Intolerances    	    REVIEW OF SYSTEMS:  CONSTITUTIONAL: No fever, weight loss, or fatigue  EYES: No eye pain, visual disturbances, or discharge  ENMT:  No difficulty hearing, tinnitus, vertigo; No sinus or throat pain  NECK: No pain or stiffness  RESPIRATORY: No cough, wheezing, chills or hemoptysis; No Shortness of Breath  CARDIOVASCULAR: No chest pain, palpitations, passing out, dizziness, shortness of breath, or leg swelling  GASTROINTESTINAL: No abdominal or epigastric pain. No nausea, vomiting, or hematemesis; No diarrhea or constipation. No melena or hematochezia.  GENITOURINARY: No dysuria, frequency, hematuria, or incontinence  NEUROLOGICAL: No headaches, memory loss, loss of strength, numbness, or tremors  SKIN: No itching, burning, rashes, or lesions   LYMPH Nodes: No enlarged glands  ENDOCRINE: No heat or cold intolerance; No hair loss  MUSCULOSKELETAL: No joint pain or swelling; No muscle, back, or extremity pain  PSYCHIATRIC: No depression, anxiety, mood swings, or difficulty sleeping  HEME/LYMPH: No easy bruising, or bleeding gums  ALLERY AND IMMUNOLOGIC: No hives or eczema	    [ ] All others negative	  [ ] Unable to obtain    PHYSICAL EXAM:  T(C): 36.6 (07-11-18 @ 15:13), Max: 36.9 (07-11-18 @ 09:00)  HR: 86 (07-11-18 @ 15:13) (79 - 97)  BP: 124/69 (07-11-18 @ 15:13) (113/72 - 158/102)  RR: 17 (07-11-18 @ 15:13) (17 - 21)  SpO2: 96% (07-11-18 @ 15:13) (95% - 97%)  Wt(kg): --  I&O's Summary    10 Jul 2018 07:01  -  11 Jul 2018 07:00  --------------------------------------------------------  IN: 1015 mL / OUT: 1875 mL / NET: -860 mL    11 Jul 2018 07:01  -  11 Jul 2018 18:56  --------------------------------------------------------  IN: 0 mL / OUT: 500 mL / NET: -500 mL        Appearance: Normal	  HEENT:   Normal oral mucosa, PERRL, EOMI	  Lymphatic: No lymphadenopathy  Cardiovascular: Normal S1 S2, No JVD, No murmurs, No edema  Respiratory: Lungs clear to auscultation	  Psychiatry: A & O x 3, Mood & affect appropriate  Gastrointestinal:  Soft, Non-tender, + BS	  Skin: No rashes, No ecchymoses, No cyanosis	  Neurologic: Non-focal  Extremities: Normal range of motion, No clubbing, cyanosis or edema  Vascular: Peripheral pulses palpable 2+ bilaterally    TELEMETRY: 	    ECG:  	  RADIOLOGY:  OTHER: 	  	  LABS:	Reviewed. 	    CARDIAC MARKERS: Troponin negative.                                  11.4   16.0  )-----------( 321      ( 11 Jul 2018 13:44 )             36.6     07-11    137  |  100  |  36<H>  ----------------------------<  137<H>  4.6   |  24  |  0.93    Ca    9.2      11 Jul 2018 13:44      proBNP:   Lipid Profile:   HgA1c:   TSH:     ASSESSMENT/PLAN: Current ECG appears unchanged from a tracing from 6/20/18.  Troponin is negative. Patient offers no cardiopulmonary complaints.  He was treated with infusion of Ringer's to replenish his intravascular volume.  No abnormalities noted on cardiac monitor.  Suggest continue current regimen.  He may ambulate with PT tomorrow.	      [35    ] minutes spent assessing the patient of which at least 50% of the time was spent coordinating with staff and attending physician.      Case discussed with:  Dr. Manjinder Xie and nursing staff.

## 2018-07-11 NOTE — PROGRESS NOTE ADULT - SUBJECTIVE AND OBJECTIVE BOX
ORTHO NOTE    [X ] Pt seen/examined at 9 AM  [ ] Pt without any complaints/in NAD.    [X ] Pt complains of:  incsisional pain but meds help if taken on time.  Minimal flatus.  He says he would prefer HPT but says that he will go to acute rehab if he doesn't progress enough.      ROS: [ ] Fever  [ ] Chills  [ ] CP [ ] SOB [ ] Dysnea  [ ] Palpitations [ ] Cough [ ] N/V/C/D [ ] Paresthia [ ] Other     [X] ROS  otherwise negative    .    PHYSICAL EXAM:    Vital Signs Last 24 Hrs  T(C): 36.4 (11 Jul 2018 05:02), Max: 36.6 (10 Jul 2018 14:44)  T(F): 97.6 (11 Jul 2018 05:02), Max: 97.9 (10 Jul 2018 14:44)  HR: 80 (11 Jul 2018 04:56) (58 - 97)  BP: 125/74 (11 Jul 2018 04:56) (113/72 - 165/94)  BP(mean): 92 (11 Jul 2018 04:56) (92 - 121)  RR: 20 (11 Jul 2018 04:56) (12 - 21)  SpO2: 95% (11 Jul 2018 04:56) (95% - 98%)    I&O's Detail    10 Jul 2018 07:01  -  11 Jul 2018 07:00  --------------------------------------------------------  IN:    IV PiggyBack: 100 mL    lactated ringers.: 915 mL  Total IN: 1015 mL    OUT:    Accordian: 340 mL    Accordian: 335 mL    Voided: 1200 mL  Total OUT: 1875 mL    Total NET: -860 mL           CAPILLARY BLOOD GLUCOSE                      Neuro:  NAD A and O x 3    Lungs:    CV:    ABD: softly distended to pt's baseline, n/t +BS    Ext:  TA/GS/EHL/FHL 5/5 silt b/l  b/l bulky dsg and aquacell in place, L drain site dsg saturated    LABS                        11.3   13.0  )-----------( 271      ( 11 Jul 2018 07:29 )             35.4                                07-11    138  |  101  |  28<H>  ----------------------------<  122<H>  4.7   |  24  |  0.80    Ca    8.9      11 Jul 2018 07:29        [ ] Other Labs  [ ] None ordered            Please check or [place] when present:  •  Heart Failure:    [ ] Acute        [ ]  Acute on Chronic        [ ] Chronic         [ ] Diastolic     [ ]  Combined    •  KEN:     [ ] ATN        [ ]  Renal medullary necrosis       [ ]  Renal cortical necrosis                  [ ] Other pathological Lesion:  •  CKD:  [ ] Stage I   [ ] Stage II  [ ] Stage III    [ ]Stage IV   [ ]  CKD V   [ ]  Other/Unspecified:    •  Abdominal Nutritional Status:   [ ] Malnutrition-See Nutrition note    [ ] Cachexia   [ ]  Other        [ ] Supplement ordered:            [ ] Morbid Obesity: BMI >=40         ASSESSMENT/PLAN:      STATUS POST:   b/l TKA pod 1    CONTINUE:          [ ] PT wbat    [ ] DVT PPX- Lovenox x 2 wk, then  bid x 4 wk    [ ] Pain Mgt adjusted regimen    [ ] Dispo plan- acute rehab vs HPT    HVs removed this AM.  Bun 28 - encouraged increase po hydration.  Dr Xie for medicine.  IS use.  Increased bowel regimen.  If no issues w/PT, will d/c tele. ORTHO NOTE    [X ] Pt seen/examined at 9 AM  [ ] Pt without any complaints/in NAD.    [X ] Pt complains of:  incsisional pain but meds help if taken on time.  Minimal flatus.  He says he would prefer HPT but says that he will go to acute rehab if he doesn't progress enough.      ROS: [ ] Fever  [ ] Chills  [ ] CP [ ] SOB [ ] Dysnea  [ ] Palpitations [ ] Cough [ ] N/V/C/D [ ] Paresthia [ ] Other     [X] ROS  otherwise negative    .    PHYSICAL EXAM:    Vital Signs Last 24 Hrs  T(C): 36.4 (11 Jul 2018 05:02), Max: 36.6 (10 Jul 2018 14:44)  T(F): 97.6 (11 Jul 2018 05:02), Max: 97.9 (10 Jul 2018 14:44)  HR: 80 (11 Jul 2018 04:56) (58 - 97)  BP: 125/74 (11 Jul 2018 04:56) (113/72 - 165/94)  BP(mean): 92 (11 Jul 2018 04:56) (92 - 121)  RR: 20 (11 Jul 2018 04:56) (12 - 21)  SpO2: 95% (11 Jul 2018 04:56) (95% - 98%)    I&O's Detail    10 Jul 2018 07:01  -  11 Jul 2018 07:00  --------------------------------------------------------  IN:    IV PiggyBack: 100 mL    lactated ringers.: 915 mL  Total IN: 1015 mL    OUT:    Accordian: 340 mL    Accordian: 335 mL    Voided: 1200 mL  Total OUT: 1875 mL    Total NET: -860 mL           CAPILLARY BLOOD GLUCOSE                      Neuro:  NAD A and O x 3    Lungs:    CV:    ABD: softly distended to pt's baseline, n/t +BS    Ext:  TA/GS/EHL/FHL 5/5 silt b/l  b/l bulky dsg and aquacell in place, L drain site dsg saturated    LABS                        11.3   13.0  )-----------( 271      ( 11 Jul 2018 07:29 )             35.4                                07-11    138  |  101  |  28<H>  ----------------------------<  122<H>  4.7   |  24  |  0.80    Ca    8.9      11 Jul 2018 07:29        [ ] Other Labs  [ ] None ordered            Please check or [place] when present:  •  Heart Failure:    [ ] Acute        [ ]  Acute on Chronic        [ ] Chronic         [ ] Diastolic     [ ]  Combined    •  KEN:     [ ] ATN        [ ]  Renal medullary necrosis       [ ]  Renal cortical necrosis                  [ ] Other pathological Lesion:  •  CKD:  [ ] Stage I   [ ] Stage II  [ ] Stage III    [ ]Stage IV   [ ]  CKD V   [ ]  Other/Unspecified:    •  Abdominal Nutritional Status:   [ ] Malnutrition-See Nutrition note    [ ] Cachexia   [ ]  Other        [ ] Supplement ordered:            [ ] Morbid Obesity: BMI >=40         ASSESSMENT/PLAN:      STATUS POST:   b/l TKA pod 1    CONTINUE:          [ ] PT wbat    [ ] DVT PPX- Lovenox x 2 wk, then  bid x 4 wk    [ ] Pain Mgt adjusted regimen    [ ] Dispo plan- acute rehab vs HPT    HVs removed this AM.  Bun 28 - encouraged increase po hydration.  Dr Xie for medicine.  IS use.  Increased bowel regimen.  If no issues w/PT, will d/c tele.      2 PM - Rapid response called.  Pt was in bathroom trying to have a BM.  He was helped off the toilet and started to feel dizzy/lightheaded/diaphoretic.  Per Rn, pt became pale, eyes glazed over, and was temporarily unresponsive for a second.  He was lowered back on the toilet and was able to respond appropriately when team came into the room.  Pt was assisted back to bed.  VSS.  Phys exam WNL.  EKG NSR.  Labs drawn - f/u results.  1 Liter IVF bolus x 1.  C/o knee pain - ordered a stat dose of IV tylenol.  Will con't tele today.  Pt reported feeling much better once he was returned to bed.  Dr Xie made aware, he said he will have Dr Juarez from cardio also see pt.  Ralph's team also made aware.

## 2018-07-12 DIAGNOSIS — D50.0 IRON DEFICIENCY ANEMIA SECONDARY TO BLOOD LOSS (CHRONIC): ICD-10-CM

## 2018-07-12 LAB
ANION GAP SERPL CALC-SCNC: 10 MMOL/L — SIGNIFICANT CHANGE UP (ref 5–17)
BUN SERPL-MCNC: 48 MG/DL — HIGH (ref 7–23)
CALCIUM SERPL-MCNC: 8.8 MG/DL — SIGNIFICANT CHANGE UP (ref 8.4–10.5)
CHLORIDE SERPL-SCNC: 102 MMOL/L — SIGNIFICANT CHANGE UP (ref 96–108)
CO2 SERPL-SCNC: 26 MMOL/L — SIGNIFICANT CHANGE UP (ref 22–31)
CREAT SERPL-MCNC: 0.83 MG/DL — SIGNIFICANT CHANGE UP (ref 0.5–1.3)
GLUCOSE SERPL-MCNC: 135 MG/DL — HIGH (ref 70–99)
HCT VFR BLD CALC: 24.4 % — LOW (ref 39–50)
HCT VFR BLD CALC: 27.7 % — LOW (ref 39–50)
HGB BLD-MCNC: 7.7 G/DL — LOW (ref 13–17)
HGB BLD-MCNC: 8.6 G/DL — LOW (ref 13–17)
MCHC RBC-ENTMCNC: 27.1 PG — SIGNIFICANT CHANGE UP (ref 27–34)
MCHC RBC-ENTMCNC: 27.4 PG — SIGNIFICANT CHANGE UP (ref 27–34)
MCHC RBC-ENTMCNC: 31 G/DL — LOW (ref 32–36)
MCHC RBC-ENTMCNC: 31.6 G/DL — LOW (ref 32–36)
MCV RBC AUTO: 86.8 FL — SIGNIFICANT CHANGE UP (ref 80–100)
MCV RBC AUTO: 87.4 FL — SIGNIFICANT CHANGE UP (ref 80–100)
PLATELET # BLD AUTO: 250 K/UL — SIGNIFICANT CHANGE UP (ref 150–400)
PLATELET # BLD AUTO: 301 K/UL — SIGNIFICANT CHANGE UP (ref 150–400)
POTASSIUM SERPL-MCNC: 4.9 MMOL/L — SIGNIFICANT CHANGE UP (ref 3.5–5.3)
POTASSIUM SERPL-SCNC: 4.9 MMOL/L — SIGNIFICANT CHANGE UP (ref 3.5–5.3)
RBC # BLD: 2.81 M/UL — LOW (ref 4.2–5.8)
RBC # BLD: 3.17 M/UL — LOW (ref 4.2–5.8)
RBC # FLD: 15.7 % — SIGNIFICANT CHANGE UP (ref 10.3–16.9)
RBC # FLD: 15.7 % — SIGNIFICANT CHANGE UP (ref 10.3–16.9)
SODIUM SERPL-SCNC: 138 MMOL/L — SIGNIFICANT CHANGE UP (ref 135–145)
WBC # BLD: 12.3 K/UL — HIGH (ref 3.8–10.5)
WBC # BLD: 14.3 K/UL — HIGH (ref 3.8–10.5)
WBC # FLD AUTO: 12.3 K/UL — HIGH (ref 3.8–10.5)
WBC # FLD AUTO: 14.3 K/UL — HIGH (ref 3.8–10.5)

## 2018-07-12 RX ORDER — METOPROLOL TARTRATE 50 MG
5 TABLET ORAL EVERY 6 HOURS
Qty: 0 | Refills: 0 | Status: DISCONTINUED | OUTPATIENT
Start: 2018-07-12 | End: 2018-07-12

## 2018-07-12 RX ORDER — SODIUM CHLORIDE 9 MG/ML
1000 INJECTION INTRAMUSCULAR; INTRAVENOUS; SUBCUTANEOUS
Qty: 0 | Refills: 0 | Status: DISCONTINUED | OUTPATIENT
Start: 2018-07-12 | End: 2018-07-15

## 2018-07-12 RX ORDER — HYDROMORPHONE HYDROCHLORIDE 2 MG/ML
0.5 INJECTION INTRAMUSCULAR; INTRAVENOUS; SUBCUTANEOUS
Qty: 0 | Refills: 0 | Status: DISCONTINUED | OUTPATIENT
Start: 2018-07-12 | End: 2018-07-12

## 2018-07-12 RX ORDER — HYDROMORPHONE HYDROCHLORIDE 2 MG/ML
0.5 INJECTION INTRAMUSCULAR; INTRAVENOUS; SUBCUTANEOUS
Qty: 0 | Refills: 0 | Status: DISCONTINUED | OUTPATIENT
Start: 2018-07-12 | End: 2018-07-17

## 2018-07-12 RX ORDER — METOPROLOL TARTRATE 50 MG
5 TABLET ORAL EVERY 6 HOURS
Qty: 0 | Refills: 0 | Status: DISCONTINUED | OUTPATIENT
Start: 2018-07-12 | End: 2018-07-13

## 2018-07-12 RX ORDER — PANTOPRAZOLE SODIUM 20 MG/1
40 TABLET, DELAYED RELEASE ORAL DAILY
Qty: 0 | Refills: 0 | Status: DISCONTINUED | OUTPATIENT
Start: 2018-07-13 | End: 2018-07-15

## 2018-07-12 RX ORDER — PANTOPRAZOLE SODIUM 20 MG/1
40 TABLET, DELAYED RELEASE ORAL ONCE
Qty: 0 | Refills: 0 | Status: COMPLETED | OUTPATIENT
Start: 2018-07-12 | End: 2018-07-12

## 2018-07-12 RX ORDER — AMLODIPINE BESYLATE 2.5 MG/1
5 TABLET ORAL DAILY
Qty: 0 | Refills: 0 | Status: DISCONTINUED | OUTPATIENT
Start: 2018-07-12 | End: 2018-07-12

## 2018-07-12 RX ORDER — PANTOPRAZOLE SODIUM 20 MG/1
40 TABLET, DELAYED RELEASE ORAL DAILY
Qty: 0 | Refills: 0 | Status: DISCONTINUED | OUTPATIENT
Start: 2018-07-12 | End: 2018-07-12

## 2018-07-12 RX ORDER — ACETAMINOPHEN 500 MG
1000 TABLET ORAL ONCE
Qty: 0 | Refills: 0 | Status: COMPLETED | OUTPATIENT
Start: 2018-07-12 | End: 2018-07-12

## 2018-07-12 RX ORDER — SODIUM CHLORIDE 9 MG/ML
1000 INJECTION INTRAMUSCULAR; INTRAVENOUS; SUBCUTANEOUS
Qty: 0 | Refills: 0 | Status: DISCONTINUED | OUTPATIENT
Start: 2018-07-12 | End: 2018-07-12

## 2018-07-12 RX ORDER — METOCLOPRAMIDE HCL 10 MG
10 TABLET ORAL EVERY 6 HOURS
Qty: 0 | Refills: 0 | Status: COMPLETED | OUTPATIENT
Start: 2018-07-12 | End: 2018-07-12

## 2018-07-12 RX ORDER — SODIUM CHLORIDE 9 MG/ML
1000 INJECTION INTRAMUSCULAR; INTRAVENOUS; SUBCUTANEOUS ONCE
Qty: 0 | Refills: 0 | Status: COMPLETED | OUTPATIENT
Start: 2018-07-12 | End: 2018-07-12

## 2018-07-12 RX ADMIN — HYDROMORPHONE HYDROCHLORIDE 0.5 MILLIGRAM(S): 2 INJECTION INTRAMUSCULAR; INTRAVENOUS; SUBCUTANEOUS at 20:10

## 2018-07-12 RX ADMIN — HYDROMORPHONE HYDROCHLORIDE 0.5 MILLIGRAM(S): 2 INJECTION INTRAMUSCULAR; INTRAVENOUS; SUBCUTANEOUS at 16:36

## 2018-07-12 RX ADMIN — HYDROMORPHONE HYDROCHLORIDE 0.5 MILLIGRAM(S): 2 INJECTION INTRAMUSCULAR; INTRAVENOUS; SUBCUTANEOUS at 07:03

## 2018-07-12 RX ADMIN — SODIUM CHLORIDE 140 MILLILITER(S): 9 INJECTION INTRAMUSCULAR; INTRAVENOUS; SUBCUTANEOUS at 19:02

## 2018-07-12 RX ADMIN — HYDROMORPHONE HYDROCHLORIDE 0.5 MILLIGRAM(S): 2 INJECTION INTRAMUSCULAR; INTRAVENOUS; SUBCUTANEOUS at 11:09

## 2018-07-12 RX ADMIN — Medication 10 MILLIGRAM(S): at 12:20

## 2018-07-12 RX ADMIN — HYDROMORPHONE HYDROCHLORIDE 0.5 MILLIGRAM(S): 2 INJECTION INTRAMUSCULAR; INTRAVENOUS; SUBCUTANEOUS at 11:23

## 2018-07-12 RX ADMIN — SODIUM CHLORIDE 80 MILLILITER(S): 9 INJECTION INTRAMUSCULAR; INTRAVENOUS; SUBCUTANEOUS at 07:58

## 2018-07-12 RX ADMIN — Medication 400 MILLIGRAM(S): at 18:41

## 2018-07-12 RX ADMIN — Medication 10 MILLIGRAM(S): at 18:43

## 2018-07-12 RX ADMIN — Medication 400 MILLIGRAM(S): at 09:02

## 2018-07-12 RX ADMIN — PANTOPRAZOLE SODIUM 40 MILLIGRAM(S): 20 TABLET, DELAYED RELEASE ORAL at 18:41

## 2018-07-12 RX ADMIN — HYDROMORPHONE HYDROCHLORIDE 0.5 MILLIGRAM(S): 2 INJECTION INTRAMUSCULAR; INTRAVENOUS; SUBCUTANEOUS at 20:25

## 2018-07-12 RX ADMIN — Medication 1000 MILLIGRAM(S): at 18:56

## 2018-07-12 RX ADMIN — OXYCODONE HYDROCHLORIDE 10 MILLIGRAM(S): 5 TABLET ORAL at 03:00

## 2018-07-12 RX ADMIN — Medication 5 MILLIGRAM(S): at 18:41

## 2018-07-12 RX ADMIN — SODIUM CHLORIDE 70 MILLILITER(S): 9 INJECTION, SOLUTION INTRAVENOUS at 01:18

## 2018-07-12 RX ADMIN — OXYCODONE HYDROCHLORIDE 10 MILLIGRAM(S): 5 TABLET ORAL at 02:07

## 2018-07-12 RX ADMIN — HYDROMORPHONE HYDROCHLORIDE 0.5 MILLIGRAM(S): 2 INJECTION INTRAMUSCULAR; INTRAVENOUS; SUBCUTANEOUS at 16:21

## 2018-07-12 RX ADMIN — PANTOPRAZOLE SODIUM 40 MILLIGRAM(S): 20 TABLET, DELAYED RELEASE ORAL at 07:46

## 2018-07-12 RX ADMIN — SODIUM CHLORIDE 1000 MILLILITER(S): 9 INJECTION INTRAMUSCULAR; INTRAVENOUS; SUBCUTANEOUS at 16:21

## 2018-07-12 RX ADMIN — HYDROMORPHONE HYDROCHLORIDE 0.5 MILLIGRAM(S): 2 INJECTION INTRAMUSCULAR; INTRAVENOUS; SUBCUTANEOUS at 08:00

## 2018-07-12 RX ADMIN — Medication 1000 MILLIGRAM(S): at 09:17

## 2018-07-12 RX ADMIN — ONDANSETRON 4 MILLIGRAM(S): 8 TABLET, FILM COATED ORAL at 07:03

## 2018-07-12 NOTE — CONSULT NOTE ADULT - CONSULT REASON
hematemesis
medical evaluation
Called for cardiology consultation on this patient in day 2 of a bilateral TKA.

## 2018-07-12 NOTE — PROGRESS NOTE ADULT - SUBJECTIVE AND OBJECTIVE BOX
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  Reports moderate pain and swelling, decreased ROM and unsteady gait has gotten worse recently. Has had to resort to higher doses of NSAIDs  with resultant arterial hypertension.  X rays confirm diagnosis.  Symptoms worse with stairs and after prolonged immobility and increased over time.  Patient day #2 post op bilateral TKR with moderate knee pain and malaise.  Patient had rapid response yesterday and seen by CV Dr. Juarez and felt to have vasovagal episode.  Troponins and EKGs unremarkable.  Today reports coffee grounds when spitting up.  Hgb 11.4.      PAST MEDICAL & SURGICAL HISTORY:  Hiatal hernia  Arthritis: both knees  [place] teeth extracted  History of inguinal hernia repair, bilateral: In childhood  History of elbow surgery: right      MEDICATIONS  (STANDING):  acetaminophen   Tablet. 975 milliGRAM(s) Oral every 8 hours  amLODIPine   Tablet 5 milliGRAM(s) Oral daily  BUpivacaine liposome 1.3% Injectable (no eMAR) 20 milliLiter(s) Local Injection once  docusate sodium 100 milliGRAM(s) Oral three times a day  enoxaparin Injectable 40 milliGRAM(s) SubCutaneous every 24 hours  lactated ringers. 1000 milliLiter(s) (70 mL/Hr) IV Continuous <Continuous>  lisinopril 20 milliGRAM(s) Oral daily  oxyCODONE  ER Tablet 10 milliGRAM(s) Oral every 12 hours  pantoprazole    Tablet 40 milliGRAM(s) Oral before breakfast  polyethylene glycol 3350 17 Gram(s) Oral daily  senna 2 Tablet(s) Oral at bedtime    MEDICATIONS  (PRN):  aluminum hydroxide/magnesium hydroxide/simethicone Suspension 30 milliLiter(s) Oral four times a day PRN Indigestion  bisacodyl Suppository 10 milliGRAM(s) Rectal daily PRN If no bowel movement by postoperative day #2  HYDROmorphone  Injectable 0.5 milliGRAM(s) IV Push every 4 hours PRN Severe Pain  magnesium hydroxide Suspension 30 milliLiter(s) Oral daily PRN Constipation  ondansetron Injectable 4 milliGRAM(s) IV Push every 6 hours PRN Nausea and/or Vomiting  oxyCODONE    IR 5 milliGRAM(s) Oral every 4 hours PRN Mild Pain  oxyCODONE    IR 10 milliGRAM(s) Oral every 4 hours PRN Moderate Pain      Allergies    No Known Allergies    REVIEW OF SYSTEMS    General:  malaise	  Skin/Breast: normal  Ophthalmologic: negative  ENMT:	normal  Respiratory and Thorax: normal  Cardiovascular:	normal  Gastrointestinal:	normal  Genitourinary:	normal  Musculoskeletal:	 bilateral knee swelling   Neurological:	normal  Psychiatric:	normal  Hematology/Lymphatics: negative  Endocrine:	negative  Allergic/Immunologic:	negative      PHYSICAL EXAM:      Vital Signs Last 24 Hrs  T(C): 36.9 (12 Jul 2018 05:12), Max: 37.1 (11 Jul 2018 21:27)  T(F): 98.5 (12 Jul 2018 05:12), Max: 98.8 (11 Jul 2018 21:27)  HR: 99 (12 Jul 2018 05:12) (79 - 107)  BP: 113/67 (12 Jul 2018 05:12) (113/67 - 134/76)  BP(mean): --  RR: 18 (12 Jul 2018 05:12) (17 - 18)  SpO2: 97% (12 Jul 2018 05:12) (95% - 98%)    Constitutional: WDWNM  Eyes: conj pale  ENMT: negative  Neck: supple  Breasts: not examined   Back: negative  Respiratory: clear to P&A  Cardiovascular: no MRGT or H  Gastrointestinal: normal bowel sounds  Genitourinary: neg  Rectal: not examined  Extremities: normal  Vascular: normal  Neurological: normal  Skin: negative  Lymph Nodes: negative  Musculoskeletal:   decreased ROM  bilateral knees  Psychiatric: anxiety                       11.4   16.0  )-----------( 321      ( 11 Jul 2018 13:44 )             36.6       07-11    137  |  100  |  36<H>  ----------------------------<  137<H>  4.6   |  24  |  0.93    Ca    9.2      11 Jul 2018 13:44

## 2018-07-12 NOTE — CONSULT NOTE ADULT - SUBJECTIVE AND OBJECTIVE BOX
Patient is a 53y old  Male who presents forf Bilateral Total Knee replacement 7/10/18 called to evaluate because of coffee ground emesis and dysphagia with decreased H/H    REVIEW OF SYSTEMS:  Constitutional: No fever, weight loss or fatigue  ENMT:  No difficulty hearing, tinnitus, vertigo; No sinus or throat pain  Respiratory: No cough, wheezing, chills or hemoptysis  Cardiovascular: No chest pain, palpitations, dizziness or leg swelling  Gastrointestinal: No abdominal or epigastric pain. No nausea, vomiting or hematemesis; No diarrhea or constipation. No melena or hematochezia.  Skin: No itching, burning, rashes or lesions   Musculoskeletal: No joint pain or swelling; No muscle, back or extremity pain    PAST MEDICAL & SURGICAL HISTORY:  Hiatal hernia h/o ulcer  Arthritis: both knees  [place] teeth extracted  History of inguinal hernia repair, bilateral: In childhood  History of elbow surgery: right      FAMILY HISTORY:  Hypercholesterolemia (Mother): Mother.  Type 2 diabetes mellitus (Sibling): Younger brother. Still living.      SOCIAL HISTORY:  Smoking Status: [ ] Current, [ ] Former, [ ] Never  Pack Years:    MEDICATIONS:  MEDICATIONS  (STANDING):  acetaminophen   Tablet. 975 milliGRAM(s) Oral every 8 hours  acetaminophen  IVPB. 1000 milliGRAM(s) IV Intermittent once  amLODIPine   Tablet 5 milliGRAM(s) Oral daily  BUpivacaine liposome 1.3% Injectable (no eMAR) 20 milliLiter(s) Local Injection once  docusate sodium 100 milliGRAM(s) Oral three times a day  lisinopril 20 milliGRAM(s) Oral daily  metoclopramide Injectable 10 milliGRAM(s) IV Push every 6 hours  oxyCODONE  ER Tablet 10 milliGRAM(s) Oral every 12 hours  pantoprazole  Injectable 40 milliGRAM(s) IV Push once  polyethylene glycol 3350 17 Gram(s) Oral daily  senna 2 Tablet(s) Oral at bedtime  sodium chloride 0.9%. 1000 milliLiter(s) (80 mL/Hr) IV Continuous <Continuous>    MEDICATIONS  (PRN):  aluminum hydroxide/magnesium hydroxide/simethicone Suspension 30 milliLiter(s) Oral four times a day PRN Indigestion  bisacodyl Suppository 10 milliGRAM(s) Rectal daily PRN If no bowel movement by postoperative day #2  HYDROmorphone  Injectable 0.5 milliGRAM(s) IV Push every 3 hours PRN Breakthrough pain  magnesium hydroxide Suspension 30 milliLiter(s) Oral daily PRN Constipation  ondansetron Injectable 4 milliGRAM(s) IV Push every 6 hours PRN Nausea and/or Vomiting  oxyCODONE    IR 5 milliGRAM(s) Oral every 4 hours PRN Mild Pain  oxyCODONE    IR 10 milliGRAM(s) Oral every 4 hours PRN Moderate Pain      Allergies    No Known Allergies    Intolerances        Vital Signs Last 24 Hrs  T(C): 36.6 (12 Jul 2018 08:44), Max: 37.1 (11 Jul 2018 21:27)  T(F): 97.9 (12 Jul 2018 08:44), Max: 98.8 (11 Jul 2018 21:27)  HR: 104 (12 Jul 2018 11:25) (79 - 110)  BP: 108/56 (12 Jul 2018 11:25) (108/56 - 134/76)  BP(mean): --  RR: 18 (12 Jul 2018 11:25) (17 - 18)  SpO2: 95% (12 Jul 2018 11:25) (95% - 98%)    07-11 @ 07:01  -  07-12 @ 07:00  --------------------------------------------------------  IN: 350 mL / OUT: 2050 mL / NET: -1700 mL    07-12 @ 07:01 - 07-12 @ 12:56  --------------------------------------------------------  IN: 500 mL / OUT: 850 mL / NET: -350 mL          PHYSICAL EXAM:    General: Well developed; well nourished; in no acute distress  HEENT: MMM, conjunctiva and sclera clear  Gastrointestinal: Soft, non-tender non-distended; Normal bowel sounds; No rebound or guarding  Extremities: Normal range of motion, No clubbing, cyanosis or edema  Neurological: Alert and oriented x3  Skin: Warm and dry. No obvious rash      LABS:                        8.6    14.3  )-----------( 301      ( 12 Jul 2018 08:15 )             27.7     07-12    138  |  102  |  48<H>  ----------------------------<  135<H>  4.9   |  26  |  0.83    Ca    8.8      12 Jul 2018 08:15            RADIOLOGY & ADDITIONAL STUDIES: Patient is a 53y old  Male who presents forf Bilateral Total Knee replacement 7/10/18 called to evaluate because of coffee ground emesis and dysphagia with decreased H/H    REVIEW OF SYSTEMS:  Constitutional: No fever, weight loss or fatigue  ENMT:  No difficulty hearing, tinnitus, vertigo; No sinus or throat pain  Respiratory: No cough, wheezing, chills or hemoptysis  Cardiovascular: No chest pain, palpitations, dizziness or leg swelling  Gastrointestinal: No abdominal or epigastric pain. No nausea, vomiting or hematemesis; + dysphagia. + hematemesis  Skin: No itching, burning, rashes or lesions   Musculoskeletal: No joint pain or swelling; No muscle, back or extremity pain    PAST MEDICAL & SURGICAL HISTORY:  Hiatal hernia h/o ulcer  Arthritis: both knees  [place] teeth extracted  History of inguinal hernia repair, bilateral: In childhood  History of elbow surgery: right      FAMILY HISTORY:  Hypercholesterolemia (Mother): Mother.  Type 2 diabetes mellitus (Sibling): Younger brother. Still living.      SOCIAL HISTORY:  Smoking Status: [ ] Current, [ ] Former, [ ] Never  Pack Years:    MEDICATIONS:  MEDICATIONS  (STANDING):  acetaminophen   Tablet. 975 milliGRAM(s) Oral every 8 hours  acetaminophen  IVPB. 1000 milliGRAM(s) IV Intermittent once  amLODIPine   Tablet 5 milliGRAM(s) Oral daily  BUpivacaine liposome 1.3% Injectable (no eMAR) 20 milliLiter(s) Local Injection once  docusate sodium 100 milliGRAM(s) Oral three times a day  lisinopril 20 milliGRAM(s) Oral daily  metoclopramide Injectable 10 milliGRAM(s) IV Push every 6 hours  oxyCODONE  ER Tablet 10 milliGRAM(s) Oral every 12 hours  pantoprazole  Injectable 40 milliGRAM(s) IV Push once  polyethylene glycol 3350 17 Gram(s) Oral daily  senna 2 Tablet(s) Oral at bedtime  sodium chloride 0.9%. 1000 milliLiter(s) (80 mL/Hr) IV Continuous <Continuous>    MEDICATIONS  (PRN):  aluminum hydroxide/magnesium hydroxide/simethicone Suspension 30 milliLiter(s) Oral four times a day PRN Indigestion  bisacodyl Suppository 10 milliGRAM(s) Rectal daily PRN If no bowel movement by postoperative day #2  HYDROmorphone  Injectable 0.5 milliGRAM(s) IV Push every 3 hours PRN Breakthrough pain  magnesium hydroxide Suspension 30 milliLiter(s) Oral daily PRN Constipation  ondansetron Injectable 4 milliGRAM(s) IV Push every 6 hours PRN Nausea and/or Vomiting  oxyCODONE    IR 5 milliGRAM(s) Oral every 4 hours PRN Mild Pain  oxyCODONE    IR 10 milliGRAM(s) Oral every 4 hours PRN Moderate Pain      Allergies    No Known Allergies    Intolerances        Vital Signs Last 24 Hrs  T(C): 36.6 (12 Jul 2018 08:44), Max: 37.1 (11 Jul 2018 21:27)  T(F): 97.9 (12 Jul 2018 08:44), Max: 98.8 (11 Jul 2018 21:27)  HR: 104 (12 Jul 2018 11:25) (79 - 110)  BP: 108/56 (12 Jul 2018 11:25) (108/56 - 134/76)  BP(mean): --  RR: 18 (12 Jul 2018 11:25) (17 - 18)  SpO2: 95% (12 Jul 2018 11:25) (95% - 98%)    07-11 @ 07:01  -  07-12 @ 07:00  --------------------------------------------------------  IN: 350 mL / OUT: 2050 mL / NET: -1700 mL    07-12 @ 07:01  -  07-12 @ 12:56  --------------------------------------------------------  IN: 500 mL / OUT: 850 mL / NET: -350 mL          PHYSICAL EXAM:    General: Well developed; well nourished; in no acute distress  HEENT: MMM, conjunctiva and sclera clear  Gastrointestinal: Soft, non-tender mildly-distended; Normal bowel sounds; No rebound or guarding  Extremities: Normal range of motion, No clubbing, cyanosis or edema  Neurological: Alert and oriented x3  Skin: Warm and dry. No obvious rash      LABS:                        8.6    14.3  )-----------( 301      ( 12 Jul 2018 08:15 )             27.7     07-12    138  |  102  |  48<H>  ----------------------------<  135<H>  4.9   |  26  |  0.83    Ca    8.8      12 Jul 2018 08:15            RADIOLOGY & ADDITIONAL STUDIES:

## 2018-07-12 NOTE — PROGRESS NOTE ADULT - SUBJECTIVE AND OBJECTIVE BOX
POST OPERATIVE DAY #:  2  STATUS POST: [place]  TKA              SUBJECTIVE: Patient seen and examined. Having coffee ground emesis, reported today. Dr. Rojas conteh, Dr. Dior, GI to see the patient. No shortness of breath, no chest pain       Pain Management:   Pain Controlled Analgesia       OBJECTIVE:     Vital Signs Last 24 Hrs  T(C): 37.2 Max: 37.2  T(F): 99 ), Max: 99  HR: 103   BP: 128/70  (108/56 - 128/70)  BP(mean): --  RR: 18  (17 - 18)  SpO2: 96% (95% - 98%)    Affected extremity:          Dressing:  clean/dry/intact           Sensation: intact to light touch            Motor exam:5 / 5 Tibialis Anterior/Gastrocnemius-Soleus          warm well perfused; capillary refill <3 seconds     LABS:                        8.6    14.3  )-----------( 301      ( 12 Jul 2018 08:15 )             27.7     07-12    138  |  102  |  48<H>  ----------------------------<  135<H>  4.9   |  26  |  0.83    Ca    8.8      12 Jul 2018 08:15            MEDICATIONS:acetaminophen   Tablet. 975 milliGRAM(s) Oral every 8 hours  acetaminophen  IVPB. 1000 milliGRAM(s) IV Intermittent once  aluminum hydroxide/magnesium hydroxide/simethicone Suspension 30 milliLiter(s) Oral four times a day PRN  amLODIPine   Tablet 5 milliGRAM(s) Oral daily  bisacodyl Suppository 10 milliGRAM(s) Rectal daily PRN  BUpivacaine liposome 1.3% Injectable (no eMAR) 20 milliLiter(s) Local Injection once  docusate sodium 100 milliGRAM(s) Oral three times a day  HYDROmorphone  Injectable 0.5 milliGRAM(s) IV Push every 3 hours PRN  lisinopril 20 milliGRAM(s) Oral daily  magnesium hydroxide Suspension 30 milliLiter(s) Oral daily PRN  metoclopramide Injectable 10 milliGRAM(s) IV Push every 6 hours  ondansetron Injectable 4 milliGRAM(s) IV Push every 6 hours PRN  oxyCODONE    IR 5 milliGRAM(s) Oral every 4 hours PRN  oxyCODONE    IR 10 milliGRAM(s) Oral every 4 hours PRN  oxyCODONE  ER Tablet 10 milliGRAM(s) Oral every 12 hours  pantoprazole  Injectable 40 milliGRAM(s) IV Push once  polyethylene glycol 3350 17 Gram(s) Oral daily  senna 2 Tablet(s) Oral at bedtime  sodium chloride 0.9%. 1000 milliLiter(s) IV Continuous <Continuous>    Anticoagulation:      Antibiotics:       Pain medications:   acetaminophen   Tablet. 975 milliGRAM(s) Oral every 8 hours  acetaminophen  IVPB. 1000 milliGRAM(s) IV Intermittent once  HYDROmorphone  Injectable 0.5 milliGRAM(s) IV Push every 3 hours PRN  metoclopramide Injectable 10 milliGRAM(s) IV Push every 6 hours  ondansetron Injectable 4 milliGRAM(s) IV Push every 6 hours PRN  oxyCODONE    IR 5 milliGRAM(s) Oral every 4 hours PRN  oxyCODONE    IR 10 milliGRAM(s) Oral every 4 hours PRN  oxyCODONE  ER Tablet 10 milliGRAM(s) Oral every 12 hours      RADIOLOGY & ADDITIONAL STUDIES:    A/P :   s/p [place] TKA   POD # 2  -    Pain control  -    DVT ppx:  Lovenox   -    ADAT  -    Check AM labs  -    Weight bearing status: WBAT   -    Resume home meds  -    Coffee ground emesis, vitals stable, Dr. Xie, Dr. Dior (GI) aware.  -    Physical Therapy  -    Dispo:  To be determined

## 2018-07-12 NOTE — PROGRESS NOTE ADULT - SUBJECTIVE AND OBJECTIVE BOX
ORTHO NOTE    [X ] Pt seen/examined at 7:50 AM.  [ ] Pt without any complaints/in NAD.    [X ] Pt complains of:  Since yesterday pt has been belching and spitting up coffee ground emesis.  Says that it has been going on since yesterday around lunchtime although he didn't mention it to anyone.  He has hx of hiatal hernia, GERD, bleeding ulcer which was treated in past with just PPI.  He was taking Nsaids prior to surgery for his OA, first meloxicam daily and then aleve several times a day.  + nausea when knee pain gets bad.  Denies abd pain, blood in stool.  He had a rapid response yesterday for vasovagal while trying to have BM.  Says that he feels better since then, no return of dizzy/lightheaded although he feels diaphoretic and over heated.        ROS: [ ] Fever  [ ] Chills  [ ] CP [ ] SOB [ ] Dysnea  [ ] Palpitations [ ] Cough [ ] N/V/C/D [ ] Paresthia [ ] Other     [X ] ROS  otherwise negative    .    PHYSICAL EXAM:    Vital Signs Last 24 Hrs  T(C): 36.9 (12 Jul 2018 05:12), Max: 37.1 (11 Jul 2018 21:27)  T(F): 98.5 (12 Jul 2018 05:12), Max: 98.8 (11 Jul 2018 21:27)  HR: 99 (12 Jul 2018 05:12) (79 - 107)  BP: 113/67 (12 Jul 2018 05:12) (113/67 - 134/76)  BP(mean): --  RR: 18 (12 Jul 2018 05:12) (17 - 18)  SpO2: 97% (12 Jul 2018 05:12) (95% - 98%)    I&O's Detail    11 Jul 2018 07:01  -  12 Jul 2018 07:00  --------------------------------------------------------  IN:    lactated ringers.: 350 mL  Total IN: 350 mL    OUT:    Voided: 2050 mL  Total OUT: 2050 mL    Total NET: -1700 mL           CAPILLARY BLOOD GLUCOSE      POCT Blood Glucose.: 132 mg/dL (11 Jul 2018 13:24)                  Neuro:  NAD A and O x 3    Lungs:    CV:    ABD: softly distended n/t n/d + bs    Ext:  TA/GS/EHL/FHL 5/5 silt b/l, crycocuff dsgs in place    LABS                        11.4   16.0  )-----------( 321      ( 11 Jul 2018 13:44 )             36.6                              PT/INR - ( 11 Jul 2018 13:44 )   PT: 12.6 sec;   INR: 1.13          PTT - ( 11 Jul 2018 13:44 )  PTT:25.6 sec  07-11    137  |  100  |  36<H>  ----------------------------<  137<H>  4.6   |  24  |  0.93    Ca    9.2      11 Jul 2018 13:44        [ ] Other Labs  [ ] None ordered            Please check or [place] when present:  •  Heart Failure:    [ ] Acute        [ ]  Acute on Chronic        [ ] Chronic         [ ] Diastolic     [ ]  Combined    •  KEN:     [ ] ATN        [ ]  Renal medullary necrosis       [ ]  Renal cortical necrosis                  [ ] Other pathological Lesion:  •  CKD:  [ ] Stage I   [ ] Stage II  [ ] Stage III    [ ]Stage IV   [ ]  CKD V   [ ]  Other/Unspecified:    •  Abdominal Nutritional Status:   [ ] Malnutrition-See Nutrition note    [ ] Cachexia   [ ]  Other        [ ] Supplement ordered:            [ ] Morbid Obesity: BMI >=40         ASSESSMENT/PLAN:      STATUS POST: b/l TKA pod 2    CONTINUE:          [ ] PT wbat    [ ] DVT PPX- on hold until cleared by GI     [ ] Pain Mgt IV pain meds until able to get po    [ ] Dispo plan- HPT vs acute rehb    GI consult with Dr Dior.  Keep NPO for now.  IVF.  IS use.  F/u AM labs.  IV protonix.  No nsaids.  Send FOBT if pt has a BM.  Will con't tele for now. ORTHO NOTE    [X ] Pt seen/examined at 7:50 AM.  [ ] Pt without any complaints/in NAD.    [X ] Pt complains of:  Since yesterday pt has been belching and spitting up coffee ground emesis.  Says that it has been going on since yesterday around lunchtime although he didn't mention it to anyone.  He has hx of hiatal hernia, GERD, bleeding ulcer which was treated in past with just PPI.  He was taking Nsaids prior to surgery for his OA, first meloxicam daily and then aleve several times a day.  + nausea when knee pain gets bad.  Denies abd pain, blood in stool.  He had a rapid response yesterday for vasovagal while trying to have BM.  Says that he feels better since then, no return of dizzy/lightheaded although he feels diaphoretic and over heated.        ROS: [ ] Fever  [ ] Chills  [ ] CP [ ] SOB [ ] Dysnea  [ ] Palpitations [ ] Cough [ ] N/V/C/D [ ] Paresthia [ ] Other     [X ] ROS  otherwise negative    .    PHYSICAL EXAM:    Vital Signs Last 24 Hrs  T(C): 36.9 (12 Jul 2018 05:12), Max: 37.1 (11 Jul 2018 21:27)  T(F): 98.5 (12 Jul 2018 05:12), Max: 98.8 (11 Jul 2018 21:27)  HR: 99 (12 Jul 2018 05:12) (79 - 107)  BP: 113/67 (12 Jul 2018 05:12) (113/67 - 134/76)  BP(mean): --  RR: 18 (12 Jul 2018 05:12) (17 - 18)  SpO2: 97% (12 Jul 2018 05:12) (95% - 98%)    I&O's Detail    11 Jul 2018 07:01  -  12 Jul 2018 07:00  --------------------------------------------------------  IN:    lactated ringers.: 350 mL  Total IN: 350 mL    OUT:    Voided: 2050 mL  Total OUT: 2050 mL    Total NET: -1700 mL           CAPILLARY BLOOD GLUCOSE      POCT Blood Glucose.: 132 mg/dL (11 Jul 2018 13:24)                  Neuro:  NAD A and O x 3    Lungs:    CV:    ABD: softly distended n/t n/d + bs    Ext:  TA/GS/EHL/FHL 5/5 silt b/l, crycocuff dsgs in place    LABS                        11.4   16.0  )-----------( 321      ( 11 Jul 2018 13:44 )             36.6                              PT/INR - ( 11 Jul 2018 13:44 )   PT: 12.6 sec;   INR: 1.13          PTT - ( 11 Jul 2018 13:44 )  PTT:25.6 sec  07-11    137  |  100  |  36<H>  ----------------------------<  137<H>  4.6   |  24  |  0.93    Ca    9.2      11 Jul 2018 13:44        [ ] Other Labs  [ ] None ordered            Please check or [place] when present:  •  Heart Failure:    [ ] Acute        [ ]  Acute on Chronic        [ ] Chronic         [ ] Diastolic     [ ]  Combined    •  KEN:     [ ] ATN        [ ]  Renal medullary necrosis       [ ]  Renal cortical necrosis                  [ ] Other pathological Lesion:  •  CKD:  [ ] Stage I   [ ] Stage II  [ ] Stage III    [ ]Stage IV   [ ]  CKD V   [ ]  Other/Unspecified:    •  Abdominal Nutritional Status:   [ ] Malnutrition-See Nutrition note    [ ] Cachexia   [ ]  Other        [ ] Supplement ordered:            [ ] Morbid Obesity: BMI >=40         ASSESSMENT/PLAN:      STATUS POST: b/l TKA pod 2    CONTINUE:          [ ] PT wbat    [ ] DVT PPX- on hold until cleared by GI     [ ] Pain Mgt IV pain meds until able to get po    [ ] Dispo plan- HPT vs acute rehb    Pt was seen by Dr Xie for medicine this morning and Dr Juarez from cardio last night.  GI consult with Dr Dior.  Keep NPO for now.  IVF.  IS use.  F/u AM labs.  Will order new type and screen.  IV protonix.  No nsaids.  Send FOBT if pt has a BM.  Will con't tele for now. ORTHO NOTE    [X ] Pt seen/examined at 7:50 AM.  [ ] Pt without any complaints/in NAD.    [X ] Pt complains of:  Since yesterday pt has been belching and spitting up coffee ground emesis.  Says that it has been going on since yesterday around lunchtime although he didn't mention it to anyone.  He has hx of hiatal hernia, GERD, bleeding ulcer which was treated in past with just PPI.  He was taking Nsaids prior to surgery for his OA, first meloxicam daily and then aleve several times a day.  + nausea when knee pain gets bad.  Denies abd pain, blood in stool.  He had a rapid response yesterday for vasovagal while trying to have BM.  Says that he feels better since then, no return of dizzy/lightheaded although he feels diaphoretic and over heated.        ROS: [ ] Fever  [ ] Chills  [ ] CP [ ] SOB [ ] Dysnea  [ ] Palpitations [ ] Cough [ ] N/V/C/D [ ] Paresthia [ ] Other     [X ] ROS  otherwise negative    .    PHYSICAL EXAM:    Vital Signs Last 24 Hrs  T(C): 36.9 (12 Jul 2018 05:12), Max: 37.1 (11 Jul 2018 21:27)  T(F): 98.5 (12 Jul 2018 05:12), Max: 98.8 (11 Jul 2018 21:27)  HR: 99 (12 Jul 2018 05:12) (79 - 107)  BP: 113/67 (12 Jul 2018 05:12) (113/67 - 134/76)  BP(mean): --  RR: 18 (12 Jul 2018 05:12) (17 - 18)  SpO2: 97% (12 Jul 2018 05:12) (95% - 98%)    I&O's Detail    11 Jul 2018 07:01  -  12 Jul 2018 07:00  --------------------------------------------------------  IN:    lactated ringers.: 350 mL  Total IN: 350 mL    OUT:    Voided: 2050 mL  Total OUT: 2050 mL    Total NET: -1700 mL           CAPILLARY BLOOD GLUCOSE      POCT Blood Glucose.: 132 mg/dL (11 Jul 2018 13:24)                  Neuro:  NAD A and O x 3    Lungs:    CV:    ABD: softly distended n/t n/d + bs    Ext:  TA/GS/EHL/FHL 5/5 silt b/l, crycocuff dsgs in place    LABS                        11.4   16.0  )-----------( 321      ( 11 Jul 2018 13:44 )             36.6                              PT/INR - ( 11 Jul 2018 13:44 )   PT: 12.6 sec;   INR: 1.13          PTT - ( 11 Jul 2018 13:44 )  PTT:25.6 sec  07-11    137  |  100  |  36<H>  ----------------------------<  137<H>  4.6   |  24  |  0.93    Ca    9.2      11 Jul 2018 13:44        [ ] Other Labs  [ ] None ordered            Please check or [place] when present:  •  Heart Failure:    [ ] Acute        [ ]  Acute on Chronic        [ ] Chronic         [ ] Diastolic     [ ]  Combined    •  KEN:     [ ] ATN        [ ]  Renal medullary necrosis       [ ]  Renal cortical necrosis                  [ ] Other pathological Lesion:  •  CKD:  [ ] Stage I   [ ] Stage II  [ ] Stage III    [ ]Stage IV   [ ]  CKD V   [ ]  Other/Unspecified:    •  Abdominal Nutritional Status:   [ ] Malnutrition-See Nutrition note    [ ] Cachexia   [ ]  Other        [ ] Supplement ordered:            [ ] Morbid Obesity: BMI >=40         ASSESSMENT/PLAN:      STATUS POST: b/l TKA pod 2    CONTINUE:          [ ] PT wbat    [ ] DVT PPX- on hold until cleared by GI     [ ] Pain Mgt IV pain meds until able to get po    [ ] Dispo plan- HPT vs acute rehb    Pt was seen by Dr Xie for medicine this morning and Dr Juarez from cardio last night.  GI consult with Dr Dior.  Keep NPO for now.  IVF.  IS use.  F/u AM labs.  Will order new type and screen.  IV protonix.  No nsaids.  Send FOBT if pt has a BM.  Will con't tele for now.      12 Pm - Saw pt with Dr Dior.  He scheduled pt for an EGD tomorrow at 7:30 AM.  Con't NPO/IVF.  Ordered 1 additional dose for IV protonix today, then to con't 40 mg daily.  Ordered reglan IV 10 mg q 6 hours x 2 doses.  Per [place], pt can have ice chips and sips of water.  Will con't to trend h/h.

## 2018-07-12 NOTE — CONSULT NOTE ADULT - ASSESSMENT
hematemsis, posthemorrhagic anemia  Plans: npo except ice chips and sips of water  IV protonix 40 mg BID  EGD scheduled  give 2 doses IV reglan

## 2018-07-13 DIAGNOSIS — K92.0 HEMATEMESIS: ICD-10-CM

## 2018-07-13 LAB
ANION GAP SERPL CALC-SCNC: 13 MMOL/L — SIGNIFICANT CHANGE UP (ref 5–17)
BUN SERPL-MCNC: 20 MG/DL — SIGNIFICANT CHANGE UP (ref 7–23)
CALCIUM SERPL-MCNC: 8.5 MG/DL — SIGNIFICANT CHANGE UP (ref 8.4–10.5)
CHLORIDE SERPL-SCNC: 103 MMOL/L — SIGNIFICANT CHANGE UP (ref 96–108)
CO2 SERPL-SCNC: 24 MMOL/L — SIGNIFICANT CHANGE UP (ref 22–31)
CREAT SERPL-MCNC: 0.69 MG/DL — SIGNIFICANT CHANGE UP (ref 0.5–1.3)
GLUCOSE SERPL-MCNC: 111 MG/DL — HIGH (ref 70–99)
HCT VFR BLD CALC: 22.5 % — LOW (ref 39–50)
HCT VFR BLD CALC: 22.7 % — LOW (ref 39–50)
HGB BLD-MCNC: 7.1 G/DL — LOW (ref 13–17)
HGB BLD-MCNC: 7.4 G/DL — LOW (ref 13–17)
MCHC RBC-ENTMCNC: 27.4 PG — SIGNIFICANT CHANGE UP (ref 27–34)
MCHC RBC-ENTMCNC: 27.9 PG — SIGNIFICANT CHANGE UP (ref 27–34)
MCHC RBC-ENTMCNC: 31.6 G/DL — LOW (ref 32–36)
MCHC RBC-ENTMCNC: 32.6 G/DL — SIGNIFICANT CHANGE UP (ref 32–36)
MCV RBC AUTO: 85.7 FL — SIGNIFICANT CHANGE UP (ref 80–100)
MCV RBC AUTO: 86.9 FL — SIGNIFICANT CHANGE UP (ref 80–100)
PLATELET # BLD AUTO: 222 K/UL — SIGNIFICANT CHANGE UP (ref 150–400)
PLATELET # BLD AUTO: 240 K/UL — SIGNIFICANT CHANGE UP (ref 150–400)
POTASSIUM SERPL-MCNC: 4 MMOL/L — SIGNIFICANT CHANGE UP (ref 3.5–5.3)
POTASSIUM SERPL-SCNC: 4 MMOL/L — SIGNIFICANT CHANGE UP (ref 3.5–5.3)
RBC # BLD: 2.59 M/UL — LOW (ref 4.2–5.8)
RBC # BLD: 2.65 M/UL — LOW (ref 4.2–5.8)
RBC # FLD: 15.4 % — SIGNIFICANT CHANGE UP (ref 10.3–16.9)
RBC # FLD: 16.2 % — SIGNIFICANT CHANGE UP (ref 10.3–16.9)
SODIUM SERPL-SCNC: 140 MMOL/L — SIGNIFICANT CHANGE UP (ref 135–145)
WBC # BLD: 10.1 K/UL — SIGNIFICANT CHANGE UP (ref 3.8–10.5)
WBC # BLD: 9.6 K/UL — SIGNIFICANT CHANGE UP (ref 3.8–10.5)
WBC # FLD AUTO: 10.1 K/UL — SIGNIFICANT CHANGE UP (ref 3.8–10.5)
WBC # FLD AUTO: 9.6 K/UL — SIGNIFICANT CHANGE UP (ref 3.8–10.5)

## 2018-07-13 RX ORDER — OXYCODONE HYDROCHLORIDE 5 MG/1
10 TABLET ORAL EVERY 4 HOURS
Qty: 0 | Refills: 0 | Status: DISCONTINUED | OUTPATIENT
Start: 2018-07-13 | End: 2018-07-16

## 2018-07-13 RX ORDER — ENOXAPARIN SODIUM 100 MG/ML
40 INJECTION SUBCUTANEOUS DAILY
Qty: 0 | Refills: 0 | Status: DISCONTINUED | OUTPATIENT
Start: 2018-07-13 | End: 2018-07-17

## 2018-07-13 RX ORDER — LISINOPRIL 2.5 MG/1
20 TABLET ORAL DAILY
Qty: 0 | Refills: 0 | Status: DISCONTINUED | OUTPATIENT
Start: 2018-07-13 | End: 2018-07-17

## 2018-07-13 RX ORDER — SUCRALFATE 1 G
1 TABLET ORAL
Qty: 0 | Refills: 0 | Status: DISCONTINUED | OUTPATIENT
Start: 2018-07-13 | End: 2018-07-17

## 2018-07-13 RX ORDER — AMLODIPINE BESYLATE 2.5 MG/1
5 TABLET ORAL DAILY
Qty: 0 | Refills: 0 | Status: DISCONTINUED | OUTPATIENT
Start: 2018-07-13 | End: 2018-07-17

## 2018-07-13 RX ORDER — METOCLOPRAMIDE HCL 10 MG
10 TABLET ORAL EVERY 8 HOURS
Qty: 0 | Refills: 0 | Status: DISCONTINUED | OUTPATIENT
Start: 2018-07-13 | End: 2018-07-17

## 2018-07-13 RX ORDER — OXYCODONE HYDROCHLORIDE 5 MG/1
5 TABLET ORAL EVERY 4 HOURS
Qty: 0 | Refills: 0 | Status: DISCONTINUED | OUTPATIENT
Start: 2018-07-13 | End: 2018-07-16

## 2018-07-13 RX ADMIN — HYDROMORPHONE HYDROCHLORIDE 0.5 MILLIGRAM(S): 2 INJECTION INTRAMUSCULAR; INTRAVENOUS; SUBCUTANEOUS at 04:20

## 2018-07-13 RX ADMIN — HYDROMORPHONE HYDROCHLORIDE 0.5 MILLIGRAM(S): 2 INJECTION INTRAMUSCULAR; INTRAVENOUS; SUBCUTANEOUS at 14:15

## 2018-07-13 RX ADMIN — Medication 1 GRAM(S): at 18:18

## 2018-07-13 RX ADMIN — HYDROMORPHONE HYDROCHLORIDE 0.5 MILLIGRAM(S): 2 INJECTION INTRAMUSCULAR; INTRAVENOUS; SUBCUTANEOUS at 07:20

## 2018-07-13 RX ADMIN — OXYCODONE HYDROCHLORIDE 10 MILLIGRAM(S): 5 TABLET ORAL at 16:59

## 2018-07-13 RX ADMIN — ENOXAPARIN SODIUM 40 MILLIGRAM(S): 100 INJECTION SUBCUTANEOUS at 12:09

## 2018-07-13 RX ADMIN — HYDROMORPHONE HYDROCHLORIDE 0.5 MILLIGRAM(S): 2 INJECTION INTRAMUSCULAR; INTRAVENOUS; SUBCUTANEOUS at 01:00

## 2018-07-13 RX ADMIN — HYDROMORPHONE HYDROCHLORIDE 0.5 MILLIGRAM(S): 2 INJECTION INTRAMUSCULAR; INTRAVENOUS; SUBCUTANEOUS at 03:23

## 2018-07-13 RX ADMIN — HYDROMORPHONE HYDROCHLORIDE 0.5 MILLIGRAM(S): 2 INJECTION INTRAMUSCULAR; INTRAVENOUS; SUBCUTANEOUS at 19:07

## 2018-07-13 RX ADMIN — OXYCODONE HYDROCHLORIDE 10 MILLIGRAM(S): 5 TABLET ORAL at 12:25

## 2018-07-13 RX ADMIN — HYDROMORPHONE HYDROCHLORIDE 0.5 MILLIGRAM(S): 2 INJECTION INTRAMUSCULAR; INTRAVENOUS; SUBCUTANEOUS at 06:54

## 2018-07-13 RX ADMIN — HYDROMORPHONE HYDROCHLORIDE 0.5 MILLIGRAM(S): 2 INJECTION INTRAMUSCULAR; INTRAVENOUS; SUBCUTANEOUS at 00:13

## 2018-07-13 RX ADMIN — OXYCODONE HYDROCHLORIDE 10 MILLIGRAM(S): 5 TABLET ORAL at 16:31

## 2018-07-13 RX ADMIN — OXYCODONE HYDROCHLORIDE 10 MILLIGRAM(S): 5 TABLET ORAL at 21:40

## 2018-07-13 RX ADMIN — Medication 5 MILLIGRAM(S): at 00:30

## 2018-07-13 RX ADMIN — LISINOPRIL 20 MILLIGRAM(S): 2.5 TABLET ORAL at 16:30

## 2018-07-13 RX ADMIN — AMLODIPINE BESYLATE 5 MILLIGRAM(S): 2.5 TABLET ORAL at 12:08

## 2018-07-13 RX ADMIN — PANTOPRAZOLE SODIUM 40 MILLIGRAM(S): 20 TABLET, DELAYED RELEASE ORAL at 12:15

## 2018-07-13 RX ADMIN — Medication 1 GRAM(S): at 23:27

## 2018-07-13 RX ADMIN — HYDROMORPHONE HYDROCHLORIDE 0.5 MILLIGRAM(S): 2 INJECTION INTRAMUSCULAR; INTRAVENOUS; SUBCUTANEOUS at 14:02

## 2018-07-13 RX ADMIN — HYDROMORPHONE HYDROCHLORIDE 0.5 MILLIGRAM(S): 2 INJECTION INTRAMUSCULAR; INTRAVENOUS; SUBCUTANEOUS at 19:25

## 2018-07-13 RX ADMIN — OXYCODONE HYDROCHLORIDE 10 MILLIGRAM(S): 5 TABLET ORAL at 20:48

## 2018-07-13 RX ADMIN — SODIUM CHLORIDE 140 MILLILITER(S): 9 INJECTION INTRAMUSCULAR; INTRAVENOUS; SUBCUTANEOUS at 19:15

## 2018-07-13 RX ADMIN — Medication 1 GRAM(S): at 12:09

## 2018-07-13 RX ADMIN — OXYCODONE HYDROCHLORIDE 10 MILLIGRAM(S): 5 TABLET ORAL at 12:09

## 2018-07-13 NOTE — PROGRESS NOTE ADULT - SUBJECTIVE AND OBJECTIVE BOX
POST OPERATIVE DAY #: 3  STATUS POST: [place]  TKA                         SUBJECTIVE: Patient seen and examined. Had coffee ground emesis yesterday. Scheduled for a endoscopy this morning.     Pain Management:  Pain Controlled Analgesia   OBJECTIVE:     Vital Signs Last 24 Hrs  T(C): 36.9 (13 Jul 2018 00:02), Max: 37.5 (12 Jul 2018 18:58)  T(F): 98.4 (13 Jul 2018 00:02), Max: 99.5 (12 Jul 2018 18:58)  HR: 97 (13 Jul 2018 00:02) (88 - 110)  BP: 146/77 (13 Jul 2018 00:02) (108/56 - 158/71)  BP(mean): --  RR: 18 (13 Jul 2018 00:02) (16 - 19)  SpO2: 97% (13 Jul 2018 00:02) (95% - 99%)    Affected extremity:          Dressing:  clean/dry/intact           Sensation:  intact to light touch         Motor exam: 5/ 5 Tibialis Anterior/Gastrocnemius-Soleus          warm well perfused; capillary refill <3 seconds     LABS:                        7.7    12.3  )-----------( 250      ( 12 Jul 2018 17:43 )             24.4     07-12    138  |  102  |  48<H>  ----------------------------<  135<H>  4.9   |  26  |  0.83    Ca    8.8      12 Jul 2018 08:15      PT/INR - ( 11 Jul 2018 13:44 )   PT: 12.6 sec;   INR: 1.13          PTT - ( 11 Jul 2018 13:44 )  PTT:25.6 sec      MEDICATIONS:acetaminophen   Tablet. 975 milliGRAM(s) Oral every 8 hours  aluminum hydroxide/magnesium hydroxide/simethicone Suspension 30 milliLiter(s) Oral four times a day PRN  bisacodyl Suppository 10 milliGRAM(s) Rectal daily PRN  BUpivacaine liposome 1.3% Injectable (no eMAR) 20 milliLiter(s) Local Injection once  docusate sodium 100 milliGRAM(s) Oral three times a day  HYDROmorphone  Injectable 0.5 milliGRAM(s) IV Push every 3 hours PRN  magnesium hydroxide Suspension 30 milliLiter(s) Oral daily PRN  metoprolol tartrate Injectable 5 milliGRAM(s) IV Push every 6 hours PRN  ondansetron Injectable 4 milliGRAM(s) IV Push every 6 hours PRN  oxyCODONE    IR 5 milliGRAM(s) Oral every 4 hours PRN  oxyCODONE    IR 10 milliGRAM(s) Oral every 4 hours PRN  oxyCODONE  ER Tablet 10 milliGRAM(s) Oral every 12 hours  pantoprazole  Injectable 40 milliGRAM(s) IV Push daily  polyethylene glycol 3350 17 Gram(s) Oral daily  senna 2 Tablet(s) Oral at bedtime  sodium chloride 0.9%. 1000 milliLiter(s) IV Continuous <Continuous>    Anticoagulation:      Antibiotics:       Pain medications:   acetaminophen   Tablet. 975 milliGRAM(s) Oral every 8 hours  HYDROmorphone  Injectable 0.5 milliGRAM(s) IV Push every 3 hours PRN  ondansetron Injectable 4 milliGRAM(s) IV Push every 6 hours PRN  oxyCODONE    IR 5 milliGRAM(s) Oral every 4 hours PRN  oxyCODONE    IR 10 milliGRAM(s) Oral every 4 hours PRN  oxyCODONE  ER Tablet 10 milliGRAM(s) Oral every 12 hours      RADIOLOGY & ADDITIONAL STUDIES:    A/P :   s/p [place] TKA  POD # 3  -    Pain control  -    ADAT  -    Check AM labs  -    Weight bearing status: WBAT    -    Resume home meds  -    Physical Therapy  -    Endoscopy this morning, NPO  -    Hgb being monitored and patient monitored  -    Dr. Xie (Med) and Dr. Dior (GI) following   -    Dispo:  To be determined

## 2018-07-13 NOTE — DIETITIAN INITIAL EVALUATION ADULT. - ENERGY NEEDS
Height: 6'2" Weight: 287lbs, IBW 190lbs+/-10%, %%, BMI 36.8  IBW used for calculations as pt >120% of IBW   Nutrient needs based on [facility] standards of care for maintenance in adults.    Needs adjusted for post-op

## 2018-07-13 NOTE — DIETITIAN INITIAL EVALUATION ADULT. - OTHER INFO
52yo M s/p b/l TKR POD3. Rapid response called 7/11 2/2 suspected vasovagal episode. Pt yesterday experienced hematemesis w/ decrease in H/H. GI now following. Pt s/p EGD today which demonstrated a large hiatal hernia, severe esophagitis w/ jennie henry tear at distal esophagus. Pt was NPO this am for EGD. Was on clear liquids at time of assessment and tolerating well. Denies N/V today. Passing flatus, no BM. Discussed likely advancement to fulls > low fiber. No known wt changes. NKFA or dietary restrictions. Skin: surgical incision; GI WDL per flowsheet. Please advance diet to liquids >> soft/low fiber once deemed medically feasible. Monitor tolerance- protonix/carafate regimen per MD.

## 2018-07-13 NOTE — PROGRESS NOTE ADULT - SUBJECTIVE AND OBJECTIVE BOX
ORTHO NOTE    [X ] Pt seen/examined.  [ ] Pt without any complaints/in NAD.    [X ] Pt complains of:  Got EGD this morning.  No episodes of coffee ground emesis this AM.  No dizzy/lightheaded but hasn't gotten oob.  C/o incisional pain.    ROS: [ ] Fever  [ ] Chills  [ ] CP [ ] SOB [ ] Dysnea  [ ] Palpitations [ ] Cough [ ] N/V/C/D [ ] Paresthia [ ] Other     [X ] ROS  otherwise negative    .    PHYSICAL EXAM:    Vital Signs Last 24 Hrs  T(C): 36.9 (13 Jul 2018 00:02), Max: 37.5 (12 Jul 2018 18:58)  T(F): 98.4 (13 Jul 2018 00:02), Max: 99.5 (12 Jul 2018 18:58)  HR: 112 (13 Jul 2018 12:19) (88 - 112)  BP: 139/65 (13 Jul 2018 12:19) (139/65 - 158/71)  BP(mean): --  RR: 20 (13 Jul 2018 12:19) (16 - 20)  SpO2: 100% (13 Jul 2018 12:19) (97% - 100%)    I&O's Detail    12 Jul 2018 07:01  -  13 Jul 2018 07:00  --------------------------------------------------------  IN:    IV PiggyBack: 200 mL    sodium chloride 0.9%: 640 mL    Sodium Chloride 0.9% IV Bolus: 1000 mL    sodium chloride 0.9%.: 1960 mL  Total IN: 3800 mL    OUT:    Emesis: 150 mL    Voided: 2875 mL  Total OUT: 3025 mL    Total NET: 775 mL      13 Jul 2018 07:01  -  13 Jul 2018 13:44  --------------------------------------------------------  IN:  Total IN: 0 mL    OUT:    Voided: 600 mL  Total OUT: 600 mL    Total NET: -600 mL           CAPILLARY BLOOD GLUCOSE                      Neuro:  NAD A and O x 3    Lungs:    CV:    ABD:  soft n/t n/d +BS    Ext: b/l TKA dsg c/d/i, TA/GS/EHL/FHL 5/5 silt b/l    LABS                        7.1    10.1  )-----------( 222      ( 13 Jul 2018 12:44 )             22.5                                07-13    140  |  103  |  20  ----------------------------<  111<H>  4.0   |  24  |  0.69    Ca    8.5      13 Jul 2018 12:44        [ ] Other Labs  [ ] None ordered            Please check or [place] when present:  •  Heart Failure:    [ ] Acute        [ ]  Acute on Chronic        [ ] Chronic         [ ] Diastolic     [ ]  Combined    •  KEN:     [ ] ATN        [ ]  Renal medullary necrosis       [ ]  Renal cortical necrosis                  [ ] Other pathological Lesion:  •  CKD:  [ ] Stage I   [ ] Stage II  [ ] Stage III    [ ]Stage IV   [ ]  CKD V   [ ]  Other/Unspecified:    •  Abdominal Nutritional Status:   [ ] Malnutrition-See Nutrition note    [ ] Cachexia   [ ]  Other        [ ] Supplement ordered:            [ ] Morbid Obesity: BMI >=40         ASSESSMENT/PLAN:      STATUS POST: b/l TKA pod 3    CONTINUE:          [ ] PT    [ ] DVT PPX- scd, Lovenox 40 mg qd x 3 weeks    [ ] Pain Mgt con't current regimen    [ ] Dispo plan- acute rehab     EGD shows hiatal hernia, jennie henry tear at distal esophagus, severe esophagitis.  Per GI, okay to resume po meds and lovenox, Clear liquid diet today, reglan prn, another 2 days of IV ppi, carafate.  No Nsaids.  Spoke to Dr Juarez and Rojas, given pt's hx of active GI bleed and persistant tachycardia, will transfuse 1 unit prbc for acute anemia 2/2 GI blood loss.  Will con't to trend h/h.  Converted pt to po meds.  IS use.  Bowel regimen.  Con't tele.   Pt agrees to go to acute rehab.

## 2018-07-13 NOTE — PROGRESS NOTE ADULT - SUBJECTIVE AND OBJECTIVE BOX
Pt seen and examined EGD today    REVIEW OF SYSTEMS:  Constitutional: No fever, weight loss or fatigue  Cardiovascular: No chest pain, palpitations, dizziness or leg swelling  Gastrointestinal: No abdominal or epigastric pain. No nausea, vomiting or hematemesis;   Skin: No itching, burning, rashes or lesions       MEDICATIONS:  MEDICATIONS  (STANDING):  acetaminophen   Tablet. 975 milliGRAM(s) Oral every 8 hours  BUpivacaine liposome 1.3% Injectable (no eMAR) 20 milliLiter(s) Local Injection once  docusate sodium 100 milliGRAM(s) Oral three times a day  oxyCODONE  ER Tablet 10 milliGRAM(s) Oral every 12 hours  pantoprazole  Injectable 40 milliGRAM(s) IV Push daily  polyethylene glycol 3350 17 Gram(s) Oral daily  senna 2 Tablet(s) Oral at bedtime  sodium chloride 0.9%. 1000 milliLiter(s) (140 mL/Hr) IV Continuous <Continuous>    MEDICATIONS  (PRN):  aluminum hydroxide/magnesium hydroxide/simethicone Suspension 30 milliLiter(s) Oral four times a day PRN Indigestion  bisacodyl Suppository 10 milliGRAM(s) Rectal daily PRN If no bowel movement by postoperative day #2  HYDROmorphone  Injectable 0.5 milliGRAM(s) IV Push every 3 hours PRN Breakthrough pain  magnesium hydroxide Suspension 30 milliLiter(s) Oral daily PRN Constipation  metoprolol tartrate Injectable 5 milliGRAM(s) IV Push every 6 hours PRN hypertension  ondansetron Injectable 4 milliGRAM(s) IV Push every 6 hours PRN Nausea and/or Vomiting  oxyCODONE    IR 5 milliGRAM(s) Oral every 4 hours PRN Mild Pain  oxyCODONE    IR 10 milliGRAM(s) Oral every 4 hours PRN Moderate Pain      Allergies    No Known Allergies    Intolerances        Vital Signs Last 24 Hrs  T(C): 36.9 (13 Jul 2018 00:02), Max: 37.5 (12 Jul 2018 18:58)  T(F): 98.4 (13 Jul 2018 00:02), Max: 99.5 (12 Jul 2018 18:58)  HR: 97 (13 Jul 2018 00:02) (88 - 110)  BP: 146/77 (13 Jul 2018 00:02) (108/56 - 158/71)  BP(mean): --  RR: 18 (13 Jul 2018 00:02) (16 - 19)  SpO2: 97% (13 Jul 2018 00:02) (95% - 99%)    07-12 @ 07:01  -  07-13 @ 07:00  --------------------------------------------------------  IN: 3660 mL / OUT: 3025 mL / NET: 635 mL    07-13 @ 07:01 - 07-13 @ 08:02  --------------------------------------------------------  IN: 0 mL / OUT: 300 mL / NET: -300 mL        PHYSICAL EXAM:    General: Well developed; well nourished; in no acute distress  HEENT: MMM, conjunctiva pale  Gastrointestinal: Soft non-tender non-distended; Normal bowel sounds; No hepatosplenomegaly  Skin: Warm and dry. No obvious rash    LABS:      CBC Full  -  ( 12 Jul 2018 17:43 )  WBC Count : 12.3 K/uL  Hemoglobin : 7.7 g/dL  Hematocrit : 24.4 %  Platelet Count - Automated : 250 K/uL  Mean Cell Volume : 86.8 fL  Mean Cell Hemoglobin : 27.4 pg  Mean Cell Hemoglobin Concentration : 31.6 g/dL  Auto Neutrophil # : x  Auto Lymphocyte # : x  Auto Monocyte # : x  Auto Eosinophil # : x  Auto Basophil # : x  Auto Neutrophil % : x  Auto Lymphocyte % : x  Auto Monocyte % : x  Auto Eosinophil % : x  Auto Basophil % : x    07-12    138  |  102  |  48<H>  ----------------------------<  135<H>  4.9   |  26  |  0.83    Ca    8.8      12 Jul 2018 08:15      PT/INR - ( 11 Jul 2018 13:44 )   PT: 12.6 sec;   INR: 1.13          PTT - ( 11 Jul 2018 13:44 )  PTT:25.6 sec                  RADIOLOGY & ADDITIONAL STUDIES (The following images were personally reviewed):

## 2018-07-13 NOTE — PROGRESS NOTE ADULT - SUBJECTIVE AND OBJECTIVE BOX
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  Reports moderate pain and swelling, decreased ROM and unsteady gait has gotten worse recently. Has had to resort to higher doses of NSAIDs  with resultant arterial hypertension.  X rays confirm diagnosis.  Symptoms worse with stairs and after prolonged immobility and increased over time.  Patient day #3 post op bilateral TKR with moderate knee pain and malaise.  Patient had rapid response several days ago and seen by CV Dr. Juarez and felt to have vasovagal episode.  Troponins and EKGs unremarkable.  Yesterday had hematemesis with Hgb 12.5 now down to 7.7.  Seen by GI Dr. Dior, started on IV protonix and NPO for endoscopy this am.    PAST MEDICAL & SURGICAL HISTORY:  Hiatal hernia GERD  Arthritis: both knees  [place] teeth extracted  History of inguinal hernia repair, bilateral: In childhood  History of elbow surgery: right      MEDICATIONS  (STANDING):  acetaminophen   Tablet. 975 milliGRAM(s) Oral every 8 hours  BUpivacaine liposome 1.3% Injectable (no eMAR) 20 milliLiter(s) Local Injection once  docusate sodium 100 milliGRAM(s) Oral three times a day  oxyCODONE  ER Tablet 10 milliGRAM(s) Oral every 12 hours  pantoprazole  Injectable 40 milliGRAM(s) IV Push daily  polyethylene glycol 3350 17 Gram(s) Oral daily  senna 2 Tablet(s) Oral at bedtime  sodium chloride 0.9%. 1000 milliLiter(s) (140 mL/Hr) IV Continuous <Continuous>    MEDICATIONS  (PRN):  aluminum hydroxide/magnesium hydroxide/simethicone Suspension 30 milliLiter(s) Oral four times a day PRN Indigestion  bisacodyl Suppository 10 milliGRAM(s) Rectal daily PRN If no bowel movement by postoperative day #2  HYDROmorphone  Injectable 0.5 milliGRAM(s) IV Push every 3 hours PRN Breakthrough pain  magnesium hydroxide Suspension 30 milliLiter(s) Oral daily PRN Constipation  metoprolol tartrate Injectable 5 milliGRAM(s) IV Push every 6 hours PRN hypertension  ondansetron Injectable 4 milliGRAM(s) IV Push every 6 hours PRN Nausea and/or Vomiting  oxyCODONE    IR 5 milliGRAM(s) Oral every 4 hours PRN Mild Pain  oxyCODONE    IR 10 milliGRAM(s) Oral every 4 hours PRN Moderate Pain      Allergies    No Known Allergies    REVIEW OF SYSTEMS    General:  malaise	  Skin/Breast: normal  Ophthalmologic: negative  ENMT:	normal  Respiratory and Thorax: normal  Cardiovascular:	normal  Gastrointestinal: trouble swallowing s/p hemetemesis  Genitourinary:	normal  Musculoskeletal:	 bilateral knee swelling   Neurological:	normal  Psychiatric:	normal  Hematology/Lymphatics:	 negative  Endocrine:	negative  Allergic/Immunologic:	negative      PHYSICAL EXAM:    Vital Signs Last 24 Hrs  T(C): 36.9 (13 Jul 2018 00:02), Max: 37.5 (12 Jul 2018 18:58)  T(F): 98.4 (13 Jul 2018 00:02), Max: 99.5 (12 Jul 2018 18:58)  HR: 97 (13 Jul 2018 00:02) (88 - 110)  BP: 146/77 (13 Jul 2018 00:02) (108/56 - 158/71)  BP(mean): --  RR: 18 (13 Jul 2018 00:02) (16 - 19)  SpO2: 97% (13 Jul 2018 00:02) (95% - 99%)    Constitutional: WDWNWM  Eyes: conj pale  ENMT: negative  Neck: supple  Breasts: not examined   Back: negative  Respiratory: clear to P&A  Cardiovascular: no MRGT or H  Gastrointestinal: normal bowel sounds  Genitourinary: neg  Rectal: not examined  Extremities: normal  Vascular: normal  Neurological: normal  Skin: negative  Lymph Nodes: negative  Musculoskeletal:   decreased ROM  bilateral knees  Psychiatric: anxiety                       7.7    12.3  )-----------( 250      ( 12 Jul 2018 17:43 )             24.4       07-12    138  |  102  |  48<H>  ----------------------------<  135<H>  4.9   |  26  |  0.83    Ca    8.8      12 Jul 2018 08:15

## 2018-07-13 NOTE — PROGRESS NOTE ADULT - SUBJECTIVE AND OBJECTIVE BOX
Events since note of 7/11/18.      Subjective: Patient had upper GI bleed since my last assessment of him. Found to have Jayashree Segovia tear. He is a known hypertensive that has been offering no cardiac complaints since my last seeing him.   He tolerated upper endoscopy without incident.    Vitals:  T(C): 36.9 (07-13-18 @ 00:02), Max: 37.5 (07-12-18 @ 18:58)  HR: 112 (07-13-18 @ 12:19) (88 - 112)  BP: 139/65 (07-13-18 @ 12:19) (139/65 - 158/71)  RR: 20 (07-13-18 @ 12:19) (16 - 20)  SpO2: 100% (07-13-18 @ 12:19) (97% - 100%)  Wt(kg): --  I&O's Summary    12 Jul 2018 07:01  -  13 Jul 2018 07:00  --------------------------------------------------------  IN: 3800 mL / OUT: 3025 mL / NET: 775 mL    13 Jul 2018 07:01  -  13 Jul 2018 13:31  --------------------------------------------------------  IN: 0 mL / OUT: 600 mL / NET: -600 mL          PHYSICAL EXAM:  Appearance: Normal	  HEENT:   Normal oral mucosa, PERRL, EOMI	  Lymphatic: No lymphadenopathy  Cardiovascular: Normal S1 S2, No JVD, No murmurs, No edema  Respiratory: Lungs clear to auscultation	  Psychiatry: A & O x 3, Mood & affect appropriate  Gastrointestinal:  Soft, Non-tender, + BS	  Skin: No rashes, No ecchymoses, No cyanosis  Neurologic: Non-focal  Extremities: Normal range of motion, No clubbing, cyanosis or edema, or calf tenderness  Vascular: Peripheral pulses palpable 2+ bilaterally    TELEMETRY: 	 Reviewed  ECG:  	      DIAGNOSTIC TESTING:  [ ] Echocardiogram:  [ ]  Catheterization:  [ ] Stress Test:    OTHER: 	      CURRENT MEDICATIONS:  amLODIPine   Tablet 5 milliGRAM(s) Oral daily  lisinopril 20 milliGRAM(s) Oral daily        HYDROmorphone  Injectable 0.5 milliGRAM(s) IV Push every 3 hours PRN  metoclopramide Injectable 10 milliGRAM(s) IV Push every 8 hours PRN  oxyCODONE    IR 10 milliGRAM(s) Oral every 4 hours PRN  oxyCODONE    IR 5 milliGRAM(s) Oral every 4 hours PRN    bisacodyl Suppository 10 milliGRAM(s) Rectal daily PRN  pantoprazole  Injectable 40 milliGRAM(s) IV Push daily  sucralfate suspension 1 Gram(s) Oral four times a day      enoxaparin Injectable 40 milliGRAM(s) SubCutaneous daily  sodium chloride 0.9%. 1000 milliLiter(s) IV Continuous <Continuous>      LABS:	 	    CARDIAC MARKERS:                                  7.1    10.1  )-----------( 222      ( 13 Jul 2018 12:44 )             22.5     07-13    140  |  103  |  20  ----------------------------<  111<H>  4.0   |  24  |  0.69    Ca    8.5      13 Jul 2018 12:44      proBNP:   Lipid Profile:   HgA1c:   TSH:     Assessment and Plan:    Patient is currently tachycardic  to 113 bpm. Though he is offering no cardiac complaint, I would transfuse him and have him reach a Hb of at least 8 gm.      [   30 ]  minutes spent assessing patient and more than 50% of the time was spent coordinating with staff and attending physician.      Case discussed with: Dr. Manjinder Xie and the orthopedic NP.

## 2018-07-14 DIAGNOSIS — K22.6 GASTRO-ESOPHAGEAL LACERATION-HEMORRHAGE SYNDROME: ICD-10-CM

## 2018-07-14 LAB
ANION GAP SERPL CALC-SCNC: 10 MMOL/L — SIGNIFICANT CHANGE UP (ref 5–17)
BLD GP AB SCN SERPL QL: NEGATIVE — SIGNIFICANT CHANGE UP
BUN SERPL-MCNC: 14 MG/DL — SIGNIFICANT CHANGE UP (ref 7–23)
CALCIUM SERPL-MCNC: 8.7 MG/DL — SIGNIFICANT CHANGE UP (ref 8.4–10.5)
CHLORIDE SERPL-SCNC: 103 MMOL/L — SIGNIFICANT CHANGE UP (ref 96–108)
CO2 SERPL-SCNC: 26 MMOL/L — SIGNIFICANT CHANGE UP (ref 22–31)
CREAT SERPL-MCNC: 0.67 MG/DL — SIGNIFICANT CHANGE UP (ref 0.5–1.3)
GLUCOSE SERPL-MCNC: 109 MG/DL — HIGH (ref 70–99)
HCT VFR BLD CALC: 22.5 % — LOW (ref 39–50)
HGB BLD-MCNC: 7.2 G/DL — LOW (ref 13–17)
MCHC RBC-ENTMCNC: 27.6 PG — SIGNIFICANT CHANGE UP (ref 27–34)
MCHC RBC-ENTMCNC: 32 G/DL — SIGNIFICANT CHANGE UP (ref 32–36)
MCV RBC AUTO: 86.2 FL — SIGNIFICANT CHANGE UP (ref 80–100)
PLATELET # BLD AUTO: 252 K/UL — SIGNIFICANT CHANGE UP (ref 150–400)
POTASSIUM SERPL-MCNC: 4.3 MMOL/L — SIGNIFICANT CHANGE UP (ref 3.5–5.3)
POTASSIUM SERPL-SCNC: 4.3 MMOL/L — SIGNIFICANT CHANGE UP (ref 3.5–5.3)
RBC # BLD: 2.61 M/UL — LOW (ref 4.2–5.8)
RBC # FLD: 15.4 % — SIGNIFICANT CHANGE UP (ref 10.3–16.9)
RH IG SCN BLD-IMP: POSITIVE — SIGNIFICANT CHANGE UP
SODIUM SERPL-SCNC: 139 MMOL/L — SIGNIFICANT CHANGE UP (ref 135–145)
WBC # BLD: 8.9 K/UL — SIGNIFICANT CHANGE UP (ref 3.8–10.5)
WBC # FLD AUTO: 8.9 K/UL — SIGNIFICANT CHANGE UP (ref 3.8–10.5)

## 2018-07-14 RX ADMIN — OXYCODONE HYDROCHLORIDE 10 MILLIGRAM(S): 5 TABLET ORAL at 23:30

## 2018-07-14 RX ADMIN — PANTOPRAZOLE SODIUM 40 MILLIGRAM(S): 20 TABLET, DELAYED RELEASE ORAL at 12:35

## 2018-07-14 RX ADMIN — OXYCODONE HYDROCHLORIDE 10 MILLIGRAM(S): 5 TABLET ORAL at 15:10

## 2018-07-14 RX ADMIN — HYDROMORPHONE HYDROCHLORIDE 0.5 MILLIGRAM(S): 2 INJECTION INTRAMUSCULAR; INTRAVENOUS; SUBCUTANEOUS at 19:45

## 2018-07-14 RX ADMIN — OXYCODONE HYDROCHLORIDE 10 MILLIGRAM(S): 5 TABLET ORAL at 09:36

## 2018-07-14 RX ADMIN — AMLODIPINE BESYLATE 5 MILLIGRAM(S): 2.5 TABLET ORAL at 05:25

## 2018-07-14 RX ADMIN — ENOXAPARIN SODIUM 40 MILLIGRAM(S): 100 INJECTION SUBCUTANEOUS at 12:36

## 2018-07-14 RX ADMIN — OXYCODONE HYDROCHLORIDE 10 MILLIGRAM(S): 5 TABLET ORAL at 23:06

## 2018-07-14 RX ADMIN — HYDROMORPHONE HYDROCHLORIDE 0.5 MILLIGRAM(S): 2 INJECTION INTRAMUSCULAR; INTRAVENOUS; SUBCUTANEOUS at 13:00

## 2018-07-14 RX ADMIN — HYDROMORPHONE HYDROCHLORIDE 0.5 MILLIGRAM(S): 2 INJECTION INTRAMUSCULAR; INTRAVENOUS; SUBCUTANEOUS at 20:15

## 2018-07-14 RX ADMIN — OXYCODONE HYDROCHLORIDE 10 MILLIGRAM(S): 5 TABLET ORAL at 14:16

## 2018-07-14 RX ADMIN — OXYCODONE HYDROCHLORIDE 10 MILLIGRAM(S): 5 TABLET ORAL at 18:39

## 2018-07-14 RX ADMIN — OXYCODONE HYDROCHLORIDE 10 MILLIGRAM(S): 5 TABLET ORAL at 05:25

## 2018-07-14 RX ADMIN — HYDROMORPHONE HYDROCHLORIDE 0.5 MILLIGRAM(S): 2 INJECTION INTRAMUSCULAR; INTRAVENOUS; SUBCUTANEOUS at 12:36

## 2018-07-14 RX ADMIN — Medication 1 GRAM(S): at 05:28

## 2018-07-14 RX ADMIN — OXYCODONE HYDROCHLORIDE 10 MILLIGRAM(S): 5 TABLET ORAL at 19:28

## 2018-07-14 RX ADMIN — LISINOPRIL 20 MILLIGRAM(S): 2.5 TABLET ORAL at 05:25

## 2018-07-14 RX ADMIN — OXYCODONE HYDROCHLORIDE 10 MILLIGRAM(S): 5 TABLET ORAL at 06:25

## 2018-07-14 RX ADMIN — Medication 1 GRAM(S): at 18:39

## 2018-07-14 RX ADMIN — OXYCODONE HYDROCHLORIDE 10 MILLIGRAM(S): 5 TABLET ORAL at 02:19

## 2018-07-14 RX ADMIN — Medication 1 GRAM(S): at 23:07

## 2018-07-14 RX ADMIN — OXYCODONE HYDROCHLORIDE 10 MILLIGRAM(S): 5 TABLET ORAL at 01:05

## 2018-07-14 RX ADMIN — OXYCODONE HYDROCHLORIDE 10 MILLIGRAM(S): 5 TABLET ORAL at 10:13

## 2018-07-14 RX ADMIN — Medication 1 GRAM(S): at 12:36

## 2018-07-14 NOTE — PROGRESS NOTE ADULT - SUBJECTIVE AND OBJECTIVE BOX
Pt seen and examined no complaints  tolerated liquid diet  HR 90s  no longer 100's  still with dysphagia, no vomiting      REVIEW OF SYSTEMS:  Constitutional: No fever, weight loss or fatigue  Cardiovascular: No chest pain, palpitations, dizziness or leg swelling  Gastrointestinal: No abdominal or epigastric pain. No nausea, vomiting or hematemesis; No melana + dysphagia  Skin: No itching, burning, rashes or lesions       MEDICATIONS:  MEDICATIONS  (STANDING):  amLODIPine   Tablet 5 milliGRAM(s) Oral daily  BUpivacaine liposome 1.3% Injectable (no eMAR) 20 milliLiter(s) Local Injection once  enoxaparin Injectable 40 milliGRAM(s) SubCutaneous daily  lisinopril 20 milliGRAM(s) Oral daily  pantoprazole  Injectable 40 milliGRAM(s) IV Push daily  sodium chloride 0.9%. 1000 milliLiter(s) (140 mL/Hr) IV Continuous <Continuous>  sucralfate suspension 1 Gram(s) Oral four times a day    MEDICATIONS  (PRN):  bisacodyl Suppository 10 milliGRAM(s) Rectal daily PRN If no bowel movement by postoperative day #2  HYDROmorphone  Injectable 0.5 milliGRAM(s) IV Push every 3 hours PRN Breakthrough pain  metoclopramide Injectable 10 milliGRAM(s) IV Push every 8 hours PRN nausea, vomiting  oxyCODONE    IR 10 milliGRAM(s) Oral every 4 hours PRN Severe Pain (7 - 10)  oxyCODONE    IR 5 milliGRAM(s) Oral every 4 hours PRN Moderate Pain (4 - 6)      Allergies    No Known Allergies    Intolerances        Vital Signs Last 24 Hrs  T(C): 37.3 (14 Jul 2018 08:35), Max: 37.3 (13 Jul 2018 13:55)  T(F): 99.2 (14 Jul 2018 08:35), Max: 99.2 (13 Jul 2018 13:55)  HR: 96 (14 Jul 2018 08:35) (96 - 112)  BP: 145/72 (14 Jul 2018 08:35) (139/65 - 165/79)  BP(mean): --  RR: 18 (14 Jul 2018 08:35) (16 - 20)  SpO2: 100% (14 Jul 2018 08:35) (95% - 100%)    07-13 @ 07:01  -  07-14 @ 07:00  --------------------------------------------------------  IN: 1540 mL / OUT: 1650 mL / NET: -110 mL        PHYSICAL EXAM:    General: Well developed; well nourished; in no acute distress  HEENT: MMM, conjunctiva pale  Gastrointestinal: Soft non-tender non-distended; Normal bowel sounds; No hepatosplenomegaly  Skin: Warm and dry. No obvious rash    LABS:      CBC Full  -  ( 14 Jul 2018 07:44 )  WBC Count : 8.9 K/uL  Hemoglobin : 7.2 g/dL  Hematocrit : 22.5 %  Platelet Count - Automated : 252 K/uL  Mean Cell Volume : 86.2 fL  Mean Cell Hemoglobin : 27.6 pg  Mean Cell Hemoglobin Concentration : 32.0 g/dL  Auto Neutrophil # : x  Auto Lymphocyte # : x  Auto Monocyte # : x  Auto Eosinophil # : x  Auto Basophil # : x  Auto Neutrophil % : x  Auto Lymphocyte % : x  Auto Monocyte % : x  Auto Eosinophil % : x  Auto Basophil % : x    07-14    139  |  103  |  14  ----------------------------<  109<H>  4.3   |  26  |  0.67    Ca    8.7      14 Jul 2018 07:44                        RADIOLOGY & ADDITIONAL STUDIES (The following images were personally reviewed):

## 2018-07-14 NOTE — PROGRESS NOTE ADULT - SUBJECTIVE AND OBJECTIVE BOX
POST OPERATIVE DAY #: 4  STATUS POST:  Left and Right  TKA                        SUBJECTIVE: Patient seen and examined at bedside. Resting comfortably. No acute complaints. Tolerating liquid diet. Denies CP/SOB. Tolerated transfusion well yesterday.    OBJECTIVE:     Vital Signs Last 24 Hrs  T(C): 37 (14 Jul 2018 04:26), Max: 37.3 (13 Jul 2018 13:55)  T(F): 98.6 (14 Jul 2018 04:26), Max: 99.2 (13 Jul 2018 13:55)  HR: 96 (14 Jul 2018 04:26) (94 - 112)  BP: 147/75 (14 Jul 2018 04:26) (139/65 - 165/79)  BP(mean): --  RR: 17 (14 Jul 2018 04:26) (16 - 20)  SpO2: 100% (14 Jul 2018 04:26) (95% - 100%)    NAD  Left/Right LE:          Dressing: clean/dry/intact         Sensation: L2-S1 grossly intact to light touch         Motor exam: EHL/FHL/TA/GS intact          No calf tenderness         warm well perfused; capillary refill <3 seconds     LABS:                        7.4    9.6   )-----------( 240      ( 13 Jul 2018 23:08 )             22.7     07-13    140  |  103  |  20  ----------------------------<  111<H>  4.0   |  24  |  0.69    Ca    8.5      13 Jul 2018 12:44            Anticoagulation:  enoxaparin Injectable 40 milliGRAM(s) SubCutaneous daily      A/P : 53y  Male s/p Right/ Left  TKA  POD # 4, s/p EGD yesterday revealed hiatal hernia and jennie rimma tear, resumed Lovenox and PO meds as per GI  -    Analgesia  -    DVT ppx  -    IV PPI  -    Reglan PRN nausea  -    appreciate medical management and recs  -    appreciate cards management and recs  -    will f/u GI recs re advancing diet  -    f/u labs  -    Weight bearing status: WBAT  -    Physical Therapy  -    Dispo: [place]

## 2018-07-14 NOTE — PROGRESS NOTE ADULT - SUBJECTIVE AND OBJECTIVE BOX
Patient is a 53y old  Male who presents with a chief complaint of Bilateral Total Knee replacement 7/9/18 (10 Jul 2018 15:21) whose hospital course has been complicated by tachycardia and hypotension due to GI bleeding from esophageal varices.  He is feel better today and has not had vomiting or nausea.  He reports esophageal soreness.  The heart rate is now below 100 bpm for most of the last 24 hours and the blood pressure readings are borderline to normal.  The patient is seen for Dr. Juarez.    Vital Signs Last 24 Hrs  T(C): 37.3 (14 Jul 2018 08:35), Max: 37.3 (13 Jul 2018 13:55)  T(F): 99.2 (14 Jul 2018 08:35), Max: 99.2 (13 Jul 2018 13:55)  HR: 96 (14 Jul 2018 08:35) (96 - 112)  BP: 145/72 (14 Jul 2018 08:35) (139/65 - 165/79)  BP(mean): --  RR: 18 (14 Jul 2018 08:35) (16 - 20)  SpO2: 100% (14 Jul 2018 08:35) (95% - 100%)    amLODIPine   Tablet 5 milliGRAM(s) Oral daily  bisacodyl Suppository 10 milliGRAM(s) Rectal daily PRN  BUpivacaine liposome 1.3% Injectable (no eMAR) 20 milliLiter(s) Local Injection once  enoxaparin Injectable 40 milliGRAM(s) SubCutaneous daily  HYDROmorphone  Injectable 0.5 milliGRAM(s) IV Push every 3 hours PRN  lisinopril 20 milliGRAM(s) Oral daily  metoclopramide Injectable 10 milliGRAM(s) IV Push every 8 hours PRN  oxyCODONE    IR 10 milliGRAM(s) Oral every 4 hours PRN  oxyCODONE    IR 5 milliGRAM(s) Oral every 4 hours PRN  pantoprazole  Injectable 40 milliGRAM(s) IV Push daily  sodium chloride 0.9%. 1000 milliLiter(s) IV Continuous <Continuous>  sucralfate suspension 1 Gram(s) Oral four times a day    PHYSICAL EXAM:  Constitutional:  in NAD  Eyes:   sclera anicteric  ENMT:   MMM  Neck:   no JVD or bruits  Back:   no CVAT, no spinal tenderness  Respiratory:    clear to P&A, no rales, no rhonchi or wheezes  Cardiovascular:   RRR  Gastrointestinal:    soft, BS are present, no areas of tenderness  Genitourinary:  no suprapubic masses or tenderness  Extremities:   mild pitting ankle edema  Vascular:   no palpable cords   Neurological:   grossly normal  Skin:    warm good turgor  Musculoskeletal:   bilateral TKR, dressings dry and intact  Psychiatric:   appropriately concerned

## 2018-07-15 LAB
ANION GAP SERPL CALC-SCNC: 10 MMOL/L — SIGNIFICANT CHANGE UP (ref 5–17)
BUN SERPL-MCNC: 12 MG/DL — SIGNIFICANT CHANGE UP (ref 7–23)
CALCIUM SERPL-MCNC: 8.7 MG/DL — SIGNIFICANT CHANGE UP (ref 8.4–10.5)
CHLORIDE SERPL-SCNC: 101 MMOL/L — SIGNIFICANT CHANGE UP (ref 96–108)
CO2 SERPL-SCNC: 26 MMOL/L — SIGNIFICANT CHANGE UP (ref 22–31)
CREAT SERPL-MCNC: 0.72 MG/DL — SIGNIFICANT CHANGE UP (ref 0.5–1.3)
GLUCOSE BLDC GLUCOMTR-MCNC: 100 MG/DL — HIGH (ref 70–99)
GLUCOSE SERPL-MCNC: 95 MG/DL — SIGNIFICANT CHANGE UP (ref 70–99)
HCT VFR BLD CALC: 23.7 % — LOW (ref 39–50)
HCT VFR BLD CALC: 26.9 % — LOW (ref 39–50)
HGB BLD-MCNC: 7.6 G/DL — LOW (ref 13–17)
HGB BLD-MCNC: 8.8 G/DL — LOW (ref 13–17)
MCHC RBC-ENTMCNC: 27.6 PG — SIGNIFICANT CHANGE UP (ref 27–34)
MCHC RBC-ENTMCNC: 27.9 PG — SIGNIFICANT CHANGE UP (ref 27–34)
MCHC RBC-ENTMCNC: 32.1 G/DL — SIGNIFICANT CHANGE UP (ref 32–36)
MCHC RBC-ENTMCNC: 32.7 G/DL — SIGNIFICANT CHANGE UP (ref 32–36)
MCV RBC AUTO: 85.4 FL — SIGNIFICANT CHANGE UP (ref 80–100)
MCV RBC AUTO: 86.2 FL — SIGNIFICANT CHANGE UP (ref 80–100)
PLATELET # BLD AUTO: 265 K/UL — SIGNIFICANT CHANGE UP (ref 150–400)
PLATELET # BLD AUTO: 288 K/UL — SIGNIFICANT CHANGE UP (ref 150–400)
POTASSIUM SERPL-MCNC: 4 MMOL/L — SIGNIFICANT CHANGE UP (ref 3.5–5.3)
POTASSIUM SERPL-SCNC: 4 MMOL/L — SIGNIFICANT CHANGE UP (ref 3.5–5.3)
RBC # BLD: 2.75 M/UL — LOW (ref 4.2–5.8)
RBC # BLD: 3.15 M/UL — LOW (ref 4.2–5.8)
RBC # FLD: 15.2 % — SIGNIFICANT CHANGE UP (ref 10.3–16.9)
RBC # FLD: 15.6 % — SIGNIFICANT CHANGE UP (ref 10.3–16.9)
SODIUM SERPL-SCNC: 137 MMOL/L — SIGNIFICANT CHANGE UP (ref 135–145)
WBC # BLD: 7.6 K/UL — SIGNIFICANT CHANGE UP (ref 3.8–10.5)
WBC # BLD: 9 K/UL — SIGNIFICANT CHANGE UP (ref 3.8–10.5)
WBC # FLD AUTO: 7.6 K/UL — SIGNIFICANT CHANGE UP (ref 3.8–10.5)
WBC # FLD AUTO: 9 K/UL — SIGNIFICANT CHANGE UP (ref 3.8–10.5)

## 2018-07-15 RX ORDER — PANTOPRAZOLE SODIUM 20 MG/1
40 TABLET, DELAYED RELEASE ORAL
Qty: 0 | Refills: 0 | Status: DISCONTINUED | OUTPATIENT
Start: 2018-07-15 | End: 2018-07-17

## 2018-07-15 RX ORDER — DOCUSATE SODIUM 100 MG
100 CAPSULE ORAL
Qty: 0 | Refills: 0 | Status: DISCONTINUED | OUTPATIENT
Start: 2018-07-15 | End: 2018-07-15

## 2018-07-15 RX ADMIN — HYDROMORPHONE HYDROCHLORIDE 0.5 MILLIGRAM(S): 2 INJECTION INTRAMUSCULAR; INTRAVENOUS; SUBCUTANEOUS at 15:20

## 2018-07-15 RX ADMIN — HYDROMORPHONE HYDROCHLORIDE 0.5 MILLIGRAM(S): 2 INJECTION INTRAMUSCULAR; INTRAVENOUS; SUBCUTANEOUS at 15:54

## 2018-07-15 RX ADMIN — Medication 1 GRAM(S): at 13:09

## 2018-07-15 RX ADMIN — ENOXAPARIN SODIUM 40 MILLIGRAM(S): 100 INJECTION SUBCUTANEOUS at 12:02

## 2018-07-15 RX ADMIN — OXYCODONE HYDROCHLORIDE 10 MILLIGRAM(S): 5 TABLET ORAL at 09:04

## 2018-07-15 RX ADMIN — HYDROMORPHONE HYDROCHLORIDE 0.5 MILLIGRAM(S): 2 INJECTION INTRAMUSCULAR; INTRAVENOUS; SUBCUTANEOUS at 00:46

## 2018-07-15 RX ADMIN — OXYCODONE HYDROCHLORIDE 10 MILLIGRAM(S): 5 TABLET ORAL at 22:10

## 2018-07-15 RX ADMIN — OXYCODONE HYDROCHLORIDE 10 MILLIGRAM(S): 5 TABLET ORAL at 04:30

## 2018-07-15 RX ADMIN — HYDROMORPHONE HYDROCHLORIDE 0.5 MILLIGRAM(S): 2 INJECTION INTRAMUSCULAR; INTRAVENOUS; SUBCUTANEOUS at 07:00

## 2018-07-15 RX ADMIN — Medication 1 GRAM(S): at 23:14

## 2018-07-15 RX ADMIN — LISINOPRIL 20 MILLIGRAM(S): 2.5 TABLET ORAL at 06:39

## 2018-07-15 RX ADMIN — OXYCODONE HYDROCHLORIDE 10 MILLIGRAM(S): 5 TABLET ORAL at 09:49

## 2018-07-15 RX ADMIN — HYDROMORPHONE HYDROCHLORIDE 0.5 MILLIGRAM(S): 2 INJECTION INTRAMUSCULAR; INTRAVENOUS; SUBCUTANEOUS at 06:40

## 2018-07-15 RX ADMIN — OXYCODONE HYDROCHLORIDE 10 MILLIGRAM(S): 5 TABLET ORAL at 21:19

## 2018-07-15 RX ADMIN — OXYCODONE HYDROCHLORIDE 10 MILLIGRAM(S): 5 TABLET ORAL at 13:52

## 2018-07-15 RX ADMIN — Medication 1 GRAM(S): at 06:42

## 2018-07-15 RX ADMIN — OXYCODONE HYDROCHLORIDE 10 MILLIGRAM(S): 5 TABLET ORAL at 13:09

## 2018-07-15 RX ADMIN — Medication 1 GRAM(S): at 17:13

## 2018-07-15 RX ADMIN — OXYCODONE HYDROCHLORIDE 10 MILLIGRAM(S): 5 TABLET ORAL at 18:36

## 2018-07-15 RX ADMIN — AMLODIPINE BESYLATE 5 MILLIGRAM(S): 2.5 TABLET ORAL at 06:39

## 2018-07-15 RX ADMIN — OXYCODONE HYDROCHLORIDE 10 MILLIGRAM(S): 5 TABLET ORAL at 17:13

## 2018-07-15 RX ADMIN — OXYCODONE HYDROCHLORIDE 10 MILLIGRAM(S): 5 TABLET ORAL at 03:29

## 2018-07-15 NOTE — PROGRESS NOTE ADULT - SUBJECTIVE AND OBJECTIVE BOX
Pt seen and examined Transfusion (III) in progress. tolerating regular diet mild dysphagia, no vmoiting      REVIEW OF SYSTEMS:  Constitutional: No fever, weight loss or fatigue  Cardiovascular: No chest pain, palpitations, dizziness or leg swelling  Gastrointestinal: No abdominal or epigastric pain. No nausea, vomiting or hematemesis; No melena   Skin: No itching, burning, rashes or lesions       MEDICATIONS:  MEDICATIONS  (STANDING):  amLODIPine   Tablet 5 milliGRAM(s) Oral daily  BUpivacaine liposome 1.3% Injectable (no eMAR) 20 milliLiter(s) Local Injection once  enoxaparin Injectable 40 milliGRAM(s) SubCutaneous daily  lisinopril 20 milliGRAM(s) Oral daily  sucralfate suspension 1 Gram(s) Oral four times a day    MEDICATIONS  (PRN):  bisacodyl Suppository 10 milliGRAM(s) Rectal daily PRN If no bowel movement by postoperative day #2  HYDROmorphone  Injectable 0.5 milliGRAM(s) IV Push every 3 hours PRN Breakthrough pain  metoclopramide Injectable 10 milliGRAM(s) IV Push every 8 hours PRN nausea, vomiting  oxyCODONE    IR 10 milliGRAM(s) Oral every 4 hours PRN Severe Pain (7 - 10)  oxyCODONE    IR 5 milliGRAM(s) Oral every 4 hours PRN Moderate Pain (4 - 6)      Allergies    No Known Allergies    Intolerances        Vital Signs Last 24 Hrs  T(C): 37.1 (15 Jul 2018 08:50), Max: 37.1 (14 Jul 2018 14:57)  T(F): 98.8 (15 Jul 2018 08:50), Max: 98.8 (15 Jul 2018 08:50)  HR: 94 (15 Jul 2018 08:50) (79 - 96)  BP: 131/72 (15 Jul 2018 08:50) (119/59 - 144/69)  BP(mean): 99 (15 Jul 2018 05:00) (99 - 99)  RR: 16 (15 Jul 2018 08:50) (16 - 18)  SpO2: 100% (15 Jul 2018 08:50) (96% - 100%)    07-14 @ 07:01  -  07-15 @ 07:00  --------------------------------------------------------  IN: 3270 mL / OUT: 4260 mL / NET: -990 mL        PHYSICAL EXAM:    General: Well developed; well nourished; in no acute distress  HEENT: MMM, conjunctiva and sclera clear  Gastrointestinal: Soft non-tender non-distended; Normal bowel sounds; No hepatosplenomegaly  Skin: Warm and dry. No obvious rash    LABS:      CBC Full  -  ( 15 Jul 2018 06:41 )  WBC Count : 7.6 K/uL  Hemoglobin : 7.6 g/dL  Hematocrit : 23.7 %  Platelet Count - Automated : 265 K/uL  Mean Cell Volume : 86.2 fL  Mean Cell Hemoglobin : 27.6 pg  Mean Cell Hemoglobin Concentration : 32.1 g/dL  Auto Neutrophil # : x  Auto Lymphocyte # : x  Auto Monocyte # : x  Auto Eosinophil # : x  Auto Basophil # : x  Auto Neutrophil % : x  Auto Lymphocyte % : x  Auto Monocyte % : x  Auto Eosinophil % : x  Auto Basophil % : x    07-15    137  |  101  |  12  ----------------------------<  95  4.0   |  26  |  0.72    Ca    8.7      15 Jul 2018 06:41                        RADIOLOGY & ADDITIONAL STUDIES (The following images were personally reviewed):

## 2018-07-15 NOTE — PROGRESS NOTE ADULT - SUBJECTIVE AND OBJECTIVE BOX
POST OPERATIVE DAY #: 5  STATUS POST:  Left and Right  TKA                        SUBJECTIVE: Patient seen and examined at bedside. Resting comfortably. No acute complaints. Was advanced to soft diet yesterday and tolerated well. Patient unable to tolerate PT yesterday. 1 Unit PRBCs transfused yesterday, tolerated well. Feeling well this AM. Denies CP/SOB/Dizinesss    OBJECTIVE:     Vital Signs Last 24 Hrs  T(C): 37 (15 Jul 2018 01:00), Max: 37.3 (14 Jul 2018 08:35)  T(F): 98.6 (15 Jul 2018 01:00), Max: 99.2 (14 Jul 2018 08:35)  HR: 83 (15 Jul 2018 05:00) (79 - 102)  BP: 144/69 (15 Jul 2018 05:00) (119/59 - 148/72)  BP(mean): 99 (15 Jul 2018 05:00) (99 - 99)  RR: 16 (15 Jul 2018 05:00) (16 - 18)  SpO2: 98% (15 Jul 2018 05:00) (96% - 100%)    NAD  Left/Right LE:          Dressing changed, incision clean and dry, staples in place         Sensation: L2-S1 grossly intact to light touch         Motor exam: EHL/FHL/TA/GS intact          No calf tenderness         warm well perfused; capillary refill <3 seconds     LABS: CBC Pending for today                        7.2    8.9   )-----------( 252      ( 14 Jul 2018 07:44 )             22.5     07-15    137  |  101  |  12  ----------------------------<  95  4.0   |  26  |  0.72    Ca    8.7      15 Jul 2018 06:41            Anticoagulation:  enoxaparin Injectable 40 milliGRAM(s) SubCutaneous daily      A/P : 53y  Male s/p Right and Left  TKA  POD # 5, s/p EGD with finding of hiatal hernia and jennie henry tear, improving  -    Analgesia  -    DVT ppx  -    DC IV PPI as per GI recs  -    advance diet to regular, DC IVF  -    f/u labs, consider transfusion if Hgb below 8  -    Weight bearing status: WBAT  -    Physical Therapy  -    Dispo: acute rehab once stable

## 2018-07-15 NOTE — PROGRESS NOTE ADULT - SUBJECTIVE AND OBJECTIVE BOX
Patient is a 53y old  Male who presents with a chief complaint of Bilateral Total Knee replacement 7/9/18 (10 Jul 2018 15:21) who became hypotensive and tachycardic in the setting of esophageal bleeding.  He reports that he got dizzy yesterday when he got up for PT.    Vital Signs Last 24 Hrs  T(C): 37 (15 Jul 2018 01:00), Max: 37.1 (14 Jul 2018 14:57)  T(F): 98.6 (15 Jul 2018 01:00), Max: 98.7 (14 Jul 2018 14:57)  HR: 83 (15 Jul 2018 05:00) (79 - 102)  BP: 144/69 (15 Jul 2018 05:00) (119/59 - 148/72)  BP(mean): 99 (15 Jul 2018 05:00) (99 - 99)  RR: 16 (15 Jul 2018 05:00) (16 - 18)  SpO2: 98% (15 Jul 2018 05:00) (96% - 98%)    amLODIPine   Tablet 5 milliGRAM(s) Oral daily  bisacodyl Suppository 10 milliGRAM(s) Rectal daily PRN  BUpivacaine liposome 1.3% Injectable (no eMAR) 20 milliLiter(s) Local Injection once  enoxaparin Injectable 40 milliGRAM(s) SubCutaneous daily  HYDROmorphone  Injectable 0.5 milliGRAM(s) IV Push every 3 hours PRN  lisinopril 20 milliGRAM(s) Oral daily  metoclopramide Injectable 10 milliGRAM(s) IV Push every 8 hours PRN  oxyCODONE    IR 10 milliGRAM(s) Oral every 4 hours PRN  oxyCODONE    IR 5 milliGRAM(s) Oral every 4 hours PRN  sucralfate suspension 1 Gram(s) Oral four times a day    PHYSICAL EXAM:  Constitutional:  in NAD  Eyes:   sclera anicteric   ENMT: mmm  Neck:  no JVD or bruits  Back:   no CVAT  Respiratory:   clear to P & A  Cardiovascular:   RRR  Gastrointestinal:   soft, N/T  Genitourinary:   no supra pubic masses or tenderness  Extremities:   mild edema  Vascular:   no cords  Neurological:  grossly normal  Musculoskeletal:   dressing clean and dry  Psychiatric:    appropriate

## 2018-07-16 LAB
ANION GAP SERPL CALC-SCNC: 13 MMOL/L — SIGNIFICANT CHANGE UP (ref 5–17)
BUN SERPL-MCNC: 15 MG/DL — SIGNIFICANT CHANGE UP (ref 7–23)
CALCIUM SERPL-MCNC: 8.7 MG/DL — SIGNIFICANT CHANGE UP (ref 8.4–10.5)
CHLORIDE SERPL-SCNC: 101 MMOL/L — SIGNIFICANT CHANGE UP (ref 96–108)
CO2 SERPL-SCNC: 25 MMOL/L — SIGNIFICANT CHANGE UP (ref 22–31)
CREAT SERPL-MCNC: 0.76 MG/DL — SIGNIFICANT CHANGE UP (ref 0.5–1.3)
GLUCOSE SERPL-MCNC: 99 MG/DL — SIGNIFICANT CHANGE UP (ref 70–99)
HCT VFR BLD CALC: 27.9 % — LOW (ref 39–50)
HGB BLD-MCNC: 8.9 G/DL — LOW (ref 13–17)
MCHC RBC-ENTMCNC: 27.2 PG — SIGNIFICANT CHANGE UP (ref 27–34)
MCHC RBC-ENTMCNC: 31.9 G/DL — LOW (ref 32–36)
MCV RBC AUTO: 85.3 FL — SIGNIFICANT CHANGE UP (ref 80–100)
OB PNL STL: POSITIVE
PLATELET # BLD AUTO: 324 K/UL — SIGNIFICANT CHANGE UP (ref 150–400)
POTASSIUM SERPL-MCNC: 4.1 MMOL/L — SIGNIFICANT CHANGE UP (ref 3.5–5.3)
POTASSIUM SERPL-SCNC: 4.1 MMOL/L — SIGNIFICANT CHANGE UP (ref 3.5–5.3)
RBC # BLD: 3.27 M/UL — LOW (ref 4.2–5.8)
RBC # FLD: 15.1 % — SIGNIFICANT CHANGE UP (ref 10.3–16.9)
SODIUM SERPL-SCNC: 139 MMOL/L — SIGNIFICANT CHANGE UP (ref 135–145)
WBC # BLD: 8.8 K/UL — SIGNIFICANT CHANGE UP (ref 3.8–10.5)
WBC # FLD AUTO: 8.8 K/UL — SIGNIFICANT CHANGE UP (ref 3.8–10.5)

## 2018-07-16 RX ORDER — LACTULOSE 10 G/15ML
20 SOLUTION ORAL ONCE
Qty: 0 | Refills: 0 | Status: COMPLETED | OUTPATIENT
Start: 2018-07-16 | End: 2018-07-16

## 2018-07-16 RX ORDER — OXYCODONE HYDROCHLORIDE 5 MG/1
5 TABLET ORAL
Qty: 0 | Refills: 0 | Status: DISCONTINUED | OUTPATIENT
Start: 2018-07-16 | End: 2018-07-17

## 2018-07-16 RX ORDER — OXYCODONE HYDROCHLORIDE 5 MG/1
10 TABLET ORAL
Qty: 0 | Refills: 0 | Status: DISCONTINUED | OUTPATIENT
Start: 2018-07-16 | End: 2018-07-17

## 2018-07-16 RX ADMIN — Medication 1 GRAM(S): at 17:59

## 2018-07-16 RX ADMIN — ENOXAPARIN SODIUM 40 MILLIGRAM(S): 100 INJECTION SUBCUTANEOUS at 12:24

## 2018-07-16 RX ADMIN — OXYCODONE HYDROCHLORIDE 10 MILLIGRAM(S): 5 TABLET ORAL at 17:59

## 2018-07-16 RX ADMIN — Medication 1 GRAM(S): at 12:24

## 2018-07-16 RX ADMIN — PANTOPRAZOLE SODIUM 40 MILLIGRAM(S): 20 TABLET, DELAYED RELEASE ORAL at 06:28

## 2018-07-16 RX ADMIN — AMLODIPINE BESYLATE 5 MILLIGRAM(S): 2.5 TABLET ORAL at 06:28

## 2018-07-16 RX ADMIN — OXYCODONE HYDROCHLORIDE 10 MILLIGRAM(S): 5 TABLET ORAL at 14:05

## 2018-07-16 RX ADMIN — OXYCODONE HYDROCHLORIDE 10 MILLIGRAM(S): 5 TABLET ORAL at 02:03

## 2018-07-16 RX ADMIN — OXYCODONE HYDROCHLORIDE 10 MILLIGRAM(S): 5 TABLET ORAL at 10:06

## 2018-07-16 RX ADMIN — LACTULOSE 20 GRAM(S): 10 SOLUTION ORAL at 10:05

## 2018-07-16 RX ADMIN — OXYCODONE HYDROCHLORIDE 10 MILLIGRAM(S): 5 TABLET ORAL at 22:39

## 2018-07-16 RX ADMIN — OXYCODONE HYDROCHLORIDE 10 MILLIGRAM(S): 5 TABLET ORAL at 07:19

## 2018-07-16 RX ADMIN — LISINOPRIL 20 MILLIGRAM(S): 2.5 TABLET ORAL at 06:28

## 2018-07-16 RX ADMIN — OXYCODONE HYDROCHLORIDE 10 MILLIGRAM(S): 5 TABLET ORAL at 02:55

## 2018-07-16 RX ADMIN — Medication 10 MILLIGRAM(S): at 10:05

## 2018-07-16 RX ADMIN — OXYCODONE HYDROCHLORIDE 10 MILLIGRAM(S): 5 TABLET ORAL at 18:45

## 2018-07-16 RX ADMIN — OXYCODONE HYDROCHLORIDE 10 MILLIGRAM(S): 5 TABLET ORAL at 13:22

## 2018-07-16 RX ADMIN — OXYCODONE HYDROCHLORIDE 10 MILLIGRAM(S): 5 TABLET ORAL at 21:39

## 2018-07-16 RX ADMIN — OXYCODONE HYDROCHLORIDE 10 MILLIGRAM(S): 5 TABLET ORAL at 06:28

## 2018-07-16 RX ADMIN — Medication 1 GRAM(S): at 06:28

## 2018-07-16 RX ADMIN — OXYCODONE HYDROCHLORIDE 10 MILLIGRAM(S): 5 TABLET ORAL at 11:00

## 2018-07-16 NOTE — PROGRESS NOTE ADULT - SUBJECTIVE AND OBJECTIVE BOX
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  Reports moderate pain and swelling, decreased ROM and unsteady gait has gotten worse recently. Has had to resort to higher doses of NSAIDs  with resultant arterial hypertension.  X rays confirm diagnosis.  Symptoms worse with stairs and after prolonged immobility and increased over time.  Patient day #6 post op bilateral TKR with moderate knee pain and malaise.  Patient had rapid response several days ago and seen by CV Dr. Juarez and felt to have vasovagal episode.  Troponins and EKGs unremarkable.  No more hematemesis hgb now up to 8.8 after transfusion of PC.  Seen by GI Dr. Dior, started on IV protonix and carafate.  Endoscopy revealed Jayashree Segovia Tear, chronic gastritis and hiatal hernia.       PAST MEDICAL & SURGICAL HISTORY:  Hiatal hernia  Arthritis: both knees  [place] teeth extracted  History of inguinal hernia repair, bilateral: In childhood  History of elbow surgery: right      MEDICATIONS  (STANDING):  amLODIPine   Tablet 5 milliGRAM(s) Oral daily  BUpivacaine liposome 1.3% Injectable (no eMAR) 20 milliLiter(s) Local Injection once  enoxaparin Injectable 40 milliGRAM(s) SubCutaneous daily  lisinopril 20 milliGRAM(s) Oral daily  pantoprazole    Tablet 40 milliGRAM(s) Oral before breakfast  sucralfate suspension 1 Gram(s) Oral four times a day    MEDICATIONS  (PRN):  bisacodyl Suppository 10 milliGRAM(s) Rectal daily PRN If no bowel movement by postoperative day #2  HYDROmorphone  Injectable 0.5 milliGRAM(s) IV Push every 3 hours PRN Breakthrough pain  metoclopramide Injectable 10 milliGRAM(s) IV Push every 8 hours PRN nausea, vomiting  oxyCODONE    IR 10 milliGRAM(s) Oral every 4 hours PRN Severe Pain (7 - 10)  oxyCODONE    IR 5 milliGRAM(s) Oral every 4 hours PRN Moderate Pain (4 - 6)      Allergies    No Known Allergies    REVIEW OF SYSTEMS    General:  malaise	  Skin/Breast: normal  Ophthalmologic: negative  ENMT:	normal  Respiratory and Thorax: normal  Cardiovascular:	normal  Gastrointestinal:	normal  Genitourinary:	normal  Musculoskeletal:	 bilateral knee swelling   Neurological:	normal  Psychiatric:	normal  Hematology/Lymphatics: negative  Endocrine:	negative  Allergic/Immunologic:	negative      PHYSICAL EXAM:    Vital Signs Last 24 Hrs  T(C): 37.1 (16 Jul 2018 04:07), Max: 37.9 (15 Jul 2018 17:26)  T(F): 98.8 (16 Jul 2018 04:07), Max: 100.2 (15 Jul 2018 17:26)  HR: 82 (16 Jul 2018 04:07) (82 - 94)  BP: 144/81 (16 Jul 2018 04:07) (131/72 - 146/83)  BP(mean): --  RR: 16 (16 Jul 2018 04:07) (16 - 17)  SpO2: 94% (16 Jul 2018 04:07) (94% - 100%)    Constitutional: WDWNWM  Eyes: conj pale  ENMT: negative  Neck: supple  Breasts: not examined   Back: negative  Respiratory: clear to P&A  Cardiovascular: no MRGT or H  Gastrointestinal: normal bowel sounds  Genitourinary: neg  Rectal: not examined  Extremities: normal  Vascular: normal  Neurological: normal  Skin: negative  Lymph Nodes: negative  Musculoskeletal:   decreased ROM  bilateral knees  Psychiatric: anxiety                        8.8    9.0   )-----------( 288      ( 15 Jul 2018 22:52 )             26.9       07-15    137  |  101  |  12  ----------------------------<  95  4.0   |  26  |  0.72    Ca    8.7      15 Jul 2018 06:41

## 2018-07-16 NOTE — PROGRESS NOTE ADULT - SUBJECTIVE AND OBJECTIVE BOX
ORTHO NOTE    [X ] Pt seen/examined this AM with [facility] fellow  [ ] Pt without any complaints/in NAD.    [X ] Pt complains of:  b/l knee pain w/PT, doesn't think the oxy lasts all 4 hours.  No n/v.  Still no BM + flatus.  s/p total of 3 units prbc transfused.  Was limited by dizzy/nausea/pain yesterday w/PT.      ROS: [ ] Fever  [ ] Chills  [ ] CP [ ] SOB [ ] Dysnea  [ ] Palpitations [ ] Cough [ ] N/V/C/D [ ] Paresthia [ ] Other     [X ] ROS  otherwise negative    .    PHYSICAL EXAM:    Vital Signs Last 24 Hrs  T(C): 36.7 (16 Jul 2018 14:15), Max: 37.9 (15 Jul 2018 17:26)  T(F): 98.1 (16 Jul 2018 14:15), Max: 100.2 (15 Jul 2018 17:26)  HR: 95 (16 Jul 2018 14:15) (82 - 98)  BP: 121/58 (16 Jul 2018 14:15) (121/58 - 159/70)  BP(mean): --  RR: 17 (16 Jul 2018 14:15) (15 - 17)  SpO2: 96% (16 Jul 2018 14:15) (94% - 99%)    I&O's Detail    15 Jul 2018 07:01  -  16 Jul 2018 07:00  --------------------------------------------------------  IN:    Packed Red Blood Cells: 335 mL  Total IN: 335 mL    OUT:    Voided: 3890 mL  Total OUT: 3890 mL    Total NET: -3555 mL      16 Jul 2018 07:01  -  16 Jul 2018 14:35  --------------------------------------------------------  IN:    Oral Fluid: 960 mL  Total IN: 960 mL    OUT:    Voided: 800 mL  Total OUT: 800 mL    Total NET: 160 mL           CAPILLARY BLOOD GLUCOSE                      Neuro:  NAD A and O x 3    Lungs:    CV:    ABD: soft n/d n/t +BS    Ext: b/l knee dsg c/d/i TA/GS/EHL/FHL 5/5 silt b/l    LABS                        8.9    8.8   )-----------( 324      ( 16 Jul 2018 06:38 )             27.9                                07-16    139  |  101  |  15  ----------------------------<  99  4.1   |  25  |  0.76    Ca    8.7      16 Jul 2018 06:38        [ ] Other Labs  [ ] None ordered            Please check or [place] when present:  •  Heart Failure:    [ ] Acute        [ ]  Acute on Chronic        [ ] Chronic         [ ] Diastolic     [ ]  Combined    •  KEN:     [ ] ATN        [ ]  Renal medullary necrosis       [ ]  Renal cortical necrosis                  [ ] Other pathological Lesion:  •  CKD:  [ ] Stage I   [ ] Stage II  [ ] Stage III    [ ]Stage IV   [ ]  CKD V   [ ]  Other/Unspecified:    •  Abdominal Nutritional Status:   [ ] Malnutrition-See Nutrition note    [ ] Cachexia   [ ]  Other        [ ] Supplement ordered:            [ ] Morbid Obesity: BMI >=40         ASSESSMENT/PLAN:      STATUS POST: b/l TKA pod 6    CONTINUE:          [ ] PT wbat    [ ] DVT PPX- lovenox x 3 wk    [ ] Pain Mgt changed oxy from q 4 to q 3 hrs    [ ] Dispo plan- acute rehab    Ordered enema/lactulose - pt had a BM.  Stool was + fobt, but per GI will take a few more days for stool to normalize.  Tolerating reg diet.  On carafate/nexium.  IS use.  Bowel regimen.  Dr Xie is following for med, Cardio - Dr Juarez.

## 2018-07-16 NOTE — PROGRESS NOTE ADULT - SUBJECTIVE AND OBJECTIVE BOX
Pt seen and examined nO COMPLAINTS  feels better    REVIEW OF SYSTEMS:  Constitutional: No fever, weight loss or fatigue  Cardiovascular: No chest pain, palpitations, dizziness or leg swelling  Gastrointestinal: No abdominal or epigastric pain. No nausea, vomiting or hematemesis; No diarrhea or constipation. No melena or hematochezia.  Skin: No itching, burning, rashes or lesions       MEDICATIONS:  MEDICATIONS  (STANDING):  amLODIPine   Tablet 5 milliGRAM(s) Oral daily  bisacodyl Suppository 10 milliGRAM(s) Rectal daily  BUpivacaine liposome 1.3% Injectable (no eMAR) 20 milliLiter(s) Local Injection once  enoxaparin Injectable 40 milliGRAM(s) SubCutaneous daily  lisinopril 20 milliGRAM(s) Oral daily  pantoprazole    Tablet 40 milliGRAM(s) Oral before breakfast  sodium biphosphate Rectal Enema 1 Enema Rectal once  sucralfate suspension 1 Gram(s) Oral four times a day    MEDICATIONS  (PRN):  HYDROmorphone  Injectable 0.5 milliGRAM(s) IV Push every 3 hours PRN Breakthrough pain  metoclopramide Injectable 10 milliGRAM(s) IV Push every 8 hours PRN nausea, vomiting  oxyCODONE    IR 10 milliGRAM(s) Oral every 3 hours PRN Severe Pain (7 - 10)  oxyCODONE    IR 5 milliGRAM(s) Oral every 3 hours PRN Moderate Pain (4 - 6)      Allergies    No Known Allergies    Intolerances        Vital Signs Last 24 Hrs  T(C): 37.4 (16 Jul 2018 09:14), Max: 37.9 (15 Jul 2018 17:26)  T(F): 99.4 (16 Jul 2018 09:14), Max: 100.2 (15 Jul 2018 17:26)  HR: 98 (16 Jul 2018 09:14) (82 - 98)  BP: 159/70 (16 Jul 2018 09:14) (136/72 - 159/70)  BP(mean): --  RR: 15 (16 Jul 2018 09:14) (15 - 17)  SpO2: 95% (16 Jul 2018 09:14) (94% - 99%)    07-15 @ 07:01  -  07-16 @ 07:00  --------------------------------------------------------  IN: 335 mL / OUT: 3890 mL / NET: -3555 mL    07-16 @ 07:01  -  07-16 @ 13:38  --------------------------------------------------------  IN: 0 mL / OUT: 500 mL / NET: -500 mL        PHYSICAL EXAM:    General: Well developed; well nourished; in no acute distress  HEENT: MMM, conjunctiva and sclera clear  Gastrointestinal: Soft non-tender non-distended; Normal bowel sounds; No hepatosplenomegaly  Skin: Warm and dry. No obvious rash    LABS:      CBC Full  -  ( 16 Jul 2018 06:38 )  WBC Count : 8.8 K/uL  Hemoglobin : 8.9 g/dL  Hematocrit : 27.9 %  Platelet Count - Automated : 324 K/uL  Mean Cell Volume : 85.3 fL  Mean Cell Hemoglobin : 27.2 pg  Mean Cell Hemoglobin Concentration : 31.9 g/dL  Auto Neutrophil # : x  Auto Lymphocyte # : x  Auto Monocyte # : x  Auto Eosinophil # : x  Auto Basophil # : x  Auto Neutrophil % : x  Auto Lymphocyte % : x  Auto Monocyte % : x  Auto Eosinophil % : x  Auto Basophil % : x    07-16    139  |  101  |  15  ----------------------------<  99  4.1   |  25  |  0.76    Ca    8.7      16 Jul 2018 06:38                        RADIOLOGY & ADDITIONAL STUDIES (The following images were personally reviewed):

## 2018-07-16 NOTE — PROGRESS NOTE ADULT - SUBJECTIVE AND OBJECTIVE BOX
POST OPERATIVE DAY #:  6  STATUS POST:  [place]  TKA                     SUBJECTIVE: Patient seen and examined. Doing well. No new issues overnight. Hgb at 8.8       Pain Management:  Pain Controlled Analgesia        OBJECTIVE:     Vital Signs Last 24 Hrs  T(C): 37.4 (16 Jul 2018 09:14), Max: 37.9 (15 Jul 2018 17:26)  T(F): 99.4 (16 Jul 2018 09:14), Max: 100.2 (15 Jul 2018 17:26)  HR: 98 (16 Jul 2018 09:14) (82 - 98)  BP: 159/70 (16 Jul 2018 09:14) (136/72 - 159/70)  BP(mean): --  RR: 15 (16 Jul 2018 09:14) (15 - 17)  SpO2: 95% (16 Jul 2018 09:14) (94% - 99%)    Affected extremity:          Dressing: clean/dry/intact           Sensation:  intact to light touch           Motor exam: 5 / 5 Tibialis Anterior/Gastrocnemius-Soleus          warm well perfused; capillary refill <3 seconds     LABS:                        8.9    8.8   )-----------( 324      ( 16 Jul 2018 06:38 )             27.9     07-16    139  |  101  |  15  ----------------------------<  99  4.1   |  25  |  0.76    Ca    8.7      16 Jul 2018 06:38            MEDICATIONS:amLODIPine   Tablet 5 milliGRAM(s) Oral daily  bisacodyl Suppository 10 milliGRAM(s) Rectal daily  BUpivacaine liposome 1.3% Injectable (no eMAR) 20 milliLiter(s) Local Injection once  enoxaparin Injectable 40 milliGRAM(s) SubCutaneous daily  HYDROmorphone  Injectable 0.5 milliGRAM(s) IV Push every 3 hours PRN  lactulose Syrup 20 Gram(s) Oral once  lisinopril 20 milliGRAM(s) Oral daily  metoclopramide Injectable 10 milliGRAM(s) IV Push every 8 hours PRN  oxyCODONE    IR 10 milliGRAM(s) Oral every 3 hours PRN  oxyCODONE    IR 5 milliGRAM(s) Oral every 3 hours PRN  pantoprazole    Tablet 40 milliGRAM(s) Oral before breakfast  sodium biphosphate Rectal Enema 1 Enema Rectal once  sucralfate suspension 1 Gram(s) Oral four times a day    Anticoagulation:  enoxaparin Injectable 40 milliGRAM(s) SubCutaneous daily      Antibiotics:       Pain medications:   HYDROmorphone  Injectable 0.5 milliGRAM(s) IV Push every 3 hours PRN  metoclopramide Injectable 10 milliGRAM(s) IV Push every 8 hours PRN  oxyCODONE    IR 10 milliGRAM(s) Oral every 3 hours PRN  oxyCODONE    IR 5 milliGRAM(s) Oral every 3 hours PRN      RADIOLOGY & ADDITIONAL STUDIES:    A/P :   s/p [place] TKA  POD # 6  -    Pain control  -    DVT ppx:   Lovenox   -    ADAT  -    Check AM labs  -    Weight bearing status: WBAT    -    Resume home meds  -    Physical Therapy  -    Dispo:       To be determined

## 2018-07-17 VITALS
OXYGEN SATURATION: 96 % | RESPIRATION RATE: 16 BRPM | DIASTOLIC BLOOD PRESSURE: 64 MMHG | TEMPERATURE: 98 F | SYSTOLIC BLOOD PRESSURE: 126 MMHG | HEART RATE: 94 BPM

## 2018-07-17 LAB
ANION GAP SERPL CALC-SCNC: 12 MMOL/L — SIGNIFICANT CHANGE UP (ref 5–17)
BUN SERPL-MCNC: 15 MG/DL — SIGNIFICANT CHANGE UP (ref 7–23)
CALCIUM SERPL-MCNC: 9 MG/DL — SIGNIFICANT CHANGE UP (ref 8.4–10.5)
CHLORIDE SERPL-SCNC: 98 MMOL/L — SIGNIFICANT CHANGE UP (ref 96–108)
CO2 SERPL-SCNC: 24 MMOL/L — SIGNIFICANT CHANGE UP (ref 22–31)
CREAT SERPL-MCNC: 0.78 MG/DL — SIGNIFICANT CHANGE UP (ref 0.5–1.3)
GLUCOSE SERPL-MCNC: 101 MG/DL — HIGH (ref 70–99)
HCT VFR BLD CALC: 26.8 % — LOW (ref 39–50)
HGB BLD-MCNC: 8.7 G/DL — LOW (ref 13–17)
MCHC RBC-ENTMCNC: 27.7 PG — SIGNIFICANT CHANGE UP (ref 27–34)
MCHC RBC-ENTMCNC: 32.5 G/DL — SIGNIFICANT CHANGE UP (ref 32–36)
MCV RBC AUTO: 85.4 FL — SIGNIFICANT CHANGE UP (ref 80–100)
PLATELET # BLD AUTO: 353 K/UL — SIGNIFICANT CHANGE UP (ref 150–400)
POTASSIUM SERPL-MCNC: 4.2 MMOL/L — SIGNIFICANT CHANGE UP (ref 3.5–5.3)
POTASSIUM SERPL-SCNC: 4.2 MMOL/L — SIGNIFICANT CHANGE UP (ref 3.5–5.3)
RBC # BLD: 3.14 M/UL — LOW (ref 4.2–5.8)
RBC # FLD: 14.9 % — SIGNIFICANT CHANGE UP (ref 10.3–16.9)
SODIUM SERPL-SCNC: 134 MMOL/L — LOW (ref 135–145)
WBC # BLD: 9.8 K/UL — SIGNIFICANT CHANGE UP (ref 3.8–10.5)
WBC # FLD AUTO: 9.8 K/UL — SIGNIFICANT CHANGE UP (ref 3.8–10.5)

## 2018-07-17 PROCEDURE — 80048 BASIC METABOLIC PNL TOTAL CA: CPT

## 2018-07-17 PROCEDURE — 36415 COLL VENOUS BLD VENIPUNCTURE: CPT

## 2018-07-17 PROCEDURE — 82550 ASSAY OF CK (CPK): CPT

## 2018-07-17 PROCEDURE — 88300 SURGICAL PATH GROSS: CPT

## 2018-07-17 PROCEDURE — 86850 RBC ANTIBODY SCREEN: CPT

## 2018-07-17 PROCEDURE — 83605 ASSAY OF LACTIC ACID: CPT

## 2018-07-17 PROCEDURE — 85025 COMPLETE CBC W/AUTO DIFF WBC: CPT

## 2018-07-17 PROCEDURE — 97110 THERAPEUTIC EXERCISES: CPT

## 2018-07-17 PROCEDURE — 97530 THERAPEUTIC ACTIVITIES: CPT

## 2018-07-17 PROCEDURE — 82962 GLUCOSE BLOOD TEST: CPT

## 2018-07-17 PROCEDURE — C1889: CPT

## 2018-07-17 PROCEDURE — 85730 THROMBOPLASTIN TIME PARTIAL: CPT

## 2018-07-17 PROCEDURE — 86901 BLOOD TYPING SEROLOGIC RH(D): CPT

## 2018-07-17 PROCEDURE — 97161 PT EVAL LOW COMPLEX 20 MIN: CPT

## 2018-07-17 PROCEDURE — P9016: CPT

## 2018-07-17 PROCEDURE — 85610 PROTHROMBIN TIME: CPT

## 2018-07-17 PROCEDURE — 73560 X-RAY EXAM OF KNEE 1 OR 2: CPT

## 2018-07-17 PROCEDURE — 86900 BLOOD TYPING SEROLOGIC ABO: CPT

## 2018-07-17 PROCEDURE — 86923 COMPATIBILITY TEST ELECTRIC: CPT

## 2018-07-17 PROCEDURE — 36430 TRANSFUSION BLD/BLD COMPNT: CPT

## 2018-07-17 PROCEDURE — C1776: CPT

## 2018-07-17 PROCEDURE — 85027 COMPLETE CBC AUTOMATED: CPT

## 2018-07-17 PROCEDURE — 97116 GAIT TRAINING THERAPY: CPT

## 2018-07-17 PROCEDURE — 84484 ASSAY OF TROPONIN QUANT: CPT

## 2018-07-17 PROCEDURE — C1713: CPT

## 2018-07-17 PROCEDURE — 82553 CREATINE MB FRACTION: CPT

## 2018-07-17 RX ORDER — ACETAMINOPHEN 500 MG
2 TABLET ORAL
Qty: 0 | Refills: 0 | COMMUNITY

## 2018-07-17 RX ORDER — MAGNESIUM HYDROXIDE 400 MG/1
0 TABLET, CHEWABLE ORAL
Qty: 0 | Refills: 0 | COMMUNITY

## 2018-07-17 RX ORDER — PANTOPRAZOLE SODIUM 20 MG/1
1 TABLET, DELAYED RELEASE ORAL
Qty: 0 | Refills: 0 | COMMUNITY
Start: 2018-07-17

## 2018-07-17 RX ORDER — OXYCODONE HYDROCHLORIDE 5 MG/1
1 TABLET ORAL
Qty: 0 | Refills: 0 | COMMUNITY
Start: 2018-07-17

## 2018-07-17 RX ORDER — SUCRALFATE 1 G
10 TABLET ORAL
Qty: 0 | Refills: 0 | COMMUNITY
Start: 2018-07-17

## 2018-07-17 RX ORDER — ACETAMINOPHEN 500 MG
975 TABLET ORAL EVERY 8 HOURS
Qty: 0 | Refills: 0 | Status: DISCONTINUED | OUTPATIENT
Start: 2018-07-17 | End: 2018-07-17

## 2018-07-17 RX ORDER — ENOXAPARIN SODIUM 100 MG/ML
0 INJECTION SUBCUTANEOUS
Qty: 0 | Refills: 0 | COMMUNITY
Start: 2018-07-17

## 2018-07-17 RX ORDER — ACETAMINOPHEN 500 MG
3 TABLET ORAL
Qty: 0 | Refills: 0 | COMMUNITY
Start: 2018-07-17

## 2018-07-17 RX ORDER — TRAMADOL HYDROCHLORIDE 50 MG/1
1 TABLET ORAL
Qty: 0 | Refills: 0 | COMMUNITY

## 2018-07-17 RX ORDER — OMEPRAZOLE 10 MG/1
1 CAPSULE, DELAYED RELEASE ORAL
Qty: 0 | Refills: 0 | COMMUNITY

## 2018-07-17 RX ADMIN — OXYCODONE HYDROCHLORIDE 10 MILLIGRAM(S): 5 TABLET ORAL at 01:51

## 2018-07-17 RX ADMIN — OXYCODONE HYDROCHLORIDE 10 MILLIGRAM(S): 5 TABLET ORAL at 02:51

## 2018-07-17 RX ADMIN — OXYCODONE HYDROCHLORIDE 10 MILLIGRAM(S): 5 TABLET ORAL at 19:15

## 2018-07-17 RX ADMIN — OXYCODONE HYDROCHLORIDE 10 MILLIGRAM(S): 5 TABLET ORAL at 10:50

## 2018-07-17 RX ADMIN — Medication 1 GRAM(S): at 11:59

## 2018-07-17 RX ADMIN — OXYCODONE HYDROCHLORIDE 10 MILLIGRAM(S): 5 TABLET ORAL at 05:52

## 2018-07-17 RX ADMIN — OXYCODONE HYDROCHLORIDE 10 MILLIGRAM(S): 5 TABLET ORAL at 06:52

## 2018-07-17 RX ADMIN — Medication 975 MILLIGRAM(S): at 14:06

## 2018-07-17 RX ADMIN — Medication 1 GRAM(S): at 00:30

## 2018-07-17 RX ADMIN — AMLODIPINE BESYLATE 5 MILLIGRAM(S): 2.5 TABLET ORAL at 05:52

## 2018-07-17 RX ADMIN — OXYCODONE HYDROCHLORIDE 10 MILLIGRAM(S): 5 TABLET ORAL at 16:41

## 2018-07-17 RX ADMIN — OXYCODONE HYDROCHLORIDE 10 MILLIGRAM(S): 5 TABLET ORAL at 17:41

## 2018-07-17 RX ADMIN — ENOXAPARIN SODIUM 40 MILLIGRAM(S): 100 INJECTION SUBCUTANEOUS at 11:59

## 2018-07-17 RX ADMIN — Medication 1 GRAM(S): at 16:41

## 2018-07-17 RX ADMIN — OXYCODONE HYDROCHLORIDE 10 MILLIGRAM(S): 5 TABLET ORAL at 13:06

## 2018-07-17 RX ADMIN — OXYCODONE HYDROCHLORIDE 10 MILLIGRAM(S): 5 TABLET ORAL at 14:06

## 2018-07-17 RX ADMIN — Medication 975 MILLIGRAM(S): at 13:06

## 2018-07-17 RX ADMIN — LISINOPRIL 20 MILLIGRAM(S): 2.5 TABLET ORAL at 05:52

## 2018-07-17 RX ADMIN — OXYCODONE HYDROCHLORIDE 10 MILLIGRAM(S): 5 TABLET ORAL at 19:40

## 2018-07-17 RX ADMIN — OXYCODONE HYDROCHLORIDE 10 MILLIGRAM(S): 5 TABLET ORAL at 09:50

## 2018-07-17 RX ADMIN — PANTOPRAZOLE SODIUM 40 MILLIGRAM(S): 20 TABLET, DELAYED RELEASE ORAL at 06:23

## 2018-07-17 RX ADMIN — Medication 1 GRAM(S): at 05:52

## 2018-07-17 NOTE — PROGRESS NOTE ADULT - PROBLEM SELECTOR PROBLEM 4
Gastroesophageal reflux disease without esophagitis
Acquired genu varum of both lower extremities
Anemia due to blood loss
Gastroesophageal reflux disease without esophagitis
Hiatal hernia
Anemia due to blood loss

## 2018-07-17 NOTE — PROGRESS NOTE ADULT - PROBLEM SELECTOR PLAN 6
continue pre op rx and diet
continue pre op rx and monitor vitals
continue pre op rx and diet
continue pre op rx and monitor vitals
endoscopy Dr. Dior this am

## 2018-07-17 NOTE — PROGRESS NOTE ADULT - PROBLEM SELECTOR PROBLEM 3
Anticipatory anxiety
Anemia due to blood loss
Gastroesophageal reflux disease without esophagitis
Hiatal hernia
Jayashree-Segovia tear
Hematemesis without nausea

## 2018-07-17 NOTE — PROGRESS NOTE ADULT - PROVIDER SPECIALTY LIST ADULT
Cardiology
Gastroenterology
Internal Medicine
Orthopedics
Gastroenterology
Internal Medicine
Internal Medicine

## 2018-07-17 NOTE — PROGRESS NOTE ADULT - PROBLEM SELECTOR PROBLEM 2
Acquired genu varum of both lower extremities
Gastroesophageal reflux disease without esophagitis
Jayashree-Segovia tear
Acquired genu varum of both lower extremities

## 2018-07-17 NOTE — PROGRESS NOTE ADULT - PROBLEM SELECTOR PLAN 7
continue on protonix and monitor vitals
hgb 11.4.  Follow cbc and monitor vitals and GI evaluation
continue rx and diet

## 2018-07-17 NOTE — PROGRESS NOTE ADULT - PROBLEM SELECTOR PLAN 5
continue pre op rx and diet
continue pre op rx and evaluation Dr. Dior
continue diet and rx
continue pre op rx and monitor vitals
continue pre op rx and monitor vitals
hgb 12.5 down to 7.7.  follow cbc transfuse if hgb goes below 7 and for endoscopy  this am

## 2018-07-17 NOTE — PROGRESS NOTE ADULT - PROBLEM SELECTOR PROBLEM 6
Hiatal hernia
Essential hypertension
Essential hypertension
Hematemesis without nausea
Hiatal hernia

## 2018-07-17 NOTE — PROGRESS NOTE ADULT - PROBLEM SELECTOR PLAN 3
support
Continue rx and diet
continue pre op rx
continue pre op rx and diet
hgb 8.8 after transfusion.  Follow cbc and monitor vitals
protonix and diet

## 2018-07-17 NOTE — PROGRESS NOTE ADULT - PROBLEM SELECTOR PLAN 2
Discussed with house staff and nursing staff.  Pain management, PT, incentive spirometer, DVT prophylaxis, repeat labs and discharge planning.
continue on protonix and carafate and GI follow up with Dr. Dior
continue pre op meds IV protonix and for scope this am
Discussed with house staff and nursing staff.  Pain management, PT, incentive spirometer, DVT prophylaxis, repeat labs and discharge planning.

## 2018-07-17 NOTE — PROGRESS NOTE ADULT - PROBLEM SELECTOR PLAN 4
continue pre op rx.  Dr. Dior (GI) to see patient and evaluate coffee grounds.
Discussed with house staff and nursing staff.  Pain management, PT, incentive spirometer, DVT prophylaxis, repeat labs and discharge planning.
continue diet and rx
continue pre op rx and diet
hgb 8.9.  Follow cbc and monitor vitals
hgb 7.4.  follow cbc and monitor vitals and transfuse if hgb < 7 or CV compromise

## 2018-07-17 NOTE — PROGRESS NOTE ADULT - SUBJECTIVE AND OBJECTIVE BOX
ORTHO NOTE    [X ] Pt seen/examined.  [ ] Pt without any complaints/in NAD.    [X ] Pt complains of: incisional pain with ROM but much better controlled with the oxycodone q 3 hrs.  Able to tolerate PT and take 5 steps yesterday w/o dizziness.      ROS: [ ] Fever  [ ] Chills  [ ] CP [ ] SOB [ ] Dysnea  [ ] Palpitations [ ] Cough [ ] N/V/C/D [ ] Paresthia [ ] Other     [X ] ROS  otherwise negative    .    PHYSICAL EXAM:    Vital Signs Last 24 Hrs  T(C): 37.2 (17 Jul 2018 13:31), Max: 37.7 (16 Jul 2018 17:45)  T(F): 99 (17 Jul 2018 13:31), Max: 99.9 (16 Jul 2018 17:45)  HR: 91 (17 Jul 2018 13:31) (81 - 98)  BP: 141/68 (17 Jul 2018 13:31) (121/58 - 146/80)  BP(mean): --  RR: 16 (17 Jul 2018 13:31) (15 - 17)  SpO2: 97% (17 Jul 2018 13:31) (95% - 99%)    I&O's Detail    16 Jul 2018 07:01  -  17 Jul 2018 07:00  --------------------------------------------------------  IN:    Oral Fluid: 1360 mL  Total IN: 1360 mL    OUT:    Voided: 3175 mL  Total OUT: 3175 mL    Total NET: -1815 mL      17 Jul 2018 07:01  -  17 Jul 2018 13:56  --------------------------------------------------------  IN:  Total IN: 0 mL    OUT:    Voided: 300 mL  Total OUT: 300 mL    Total NET: -300 mL           CAPILLARY BLOOD GLUCOSE                      Neuro:  NAD A and O x 3    Lungs:    CV:    ABD:     Ext:  b/l knee dsg C/D/I TA/GS/EHL/FHL 5/5 silt     LABS                        8.7    9.8   )-----------( 353      ( 17 Jul 2018 06:44 )             26.8                                07-17    134<L>  |  98  |  15  ----------------------------<  101<H>  4.2   |  24  |  0.78    Ca    9.0      17 Jul 2018 06:44        [ ] Other Labs  [ ] None ordered            Please check or [place] when present:  •  Heart Failure:    [ ] Acute        [ ]  Acute on Chronic        [ ] Chronic         [ ] Diastolic     [ ]  Combined    •  KEN:     [ ] ATN        [ ]  Renal medullary necrosis       [ ]  Renal cortical necrosis                  [ ] Other pathological Lesion:  •  CKD:  [ ] Stage I   [ ] Stage II  [ ] Stage III    [ ]Stage IV   [ ]  CKD V   [ ]  Other/Unspecified:    •  Abdominal Nutritional Status:   [ ] Malnutrition-See Nutrition note    [ ] Cachexia   [ ]  Other        [ ] Supplement ordered:            [ ] Morbid Obesity: BMI >=40         ASSESSMENT/PLAN:      STATUS POST: b/l TKA pod 7    CONTINUE:          [ ] PT wbat    [ ] DVT PPX- lovenox x 3 wk from surgery    [ ] Pain Mgt con't current regimen    [ ] Dispo plan- acute rehab    H/H con't to be stable.  Cleared for rehab anytime. ORTHO NOTE    [X ] Pt seen/examined.  [ ] Pt without any complaints/in NAD.    [X ] Pt complains of: incisional pain with ROM but much better controlled with the oxycodone q 3 hrs.  Able to tolerate PT and take 5 steps yesterday w/o dizziness.      ROS: [ ] Fever  [ ] Chills  [ ] CP [ ] SOB [ ] Dysnea  [ ] Palpitations [ ] Cough [ ] N/V/C/D [ ] Paresthia [ ] Other     [X ] ROS  otherwise negative    .    PHYSICAL EXAM:    Vital Signs Last 24 Hrs  T(C): 37.2 (17 Jul 2018 13:31), Max: 37.7 (16 Jul 2018 17:45)  T(F): 99 (17 Jul 2018 13:31), Max: 99.9 (16 Jul 2018 17:45)  HR: 91 (17 Jul 2018 13:31) (81 - 98)  BP: 141/68 (17 Jul 2018 13:31) (121/58 - 146/80)  BP(mean): --  RR: 16 (17 Jul 2018 13:31) (15 - 17)  SpO2: 97% (17 Jul 2018 13:31) (95% - 99%)    I&O's Detail    16 Jul 2018 07:01  -  17 Jul 2018 07:00  --------------------------------------------------------  IN:    Oral Fluid: 1360 mL  Total IN: 1360 mL    OUT:    Voided: 3175 mL  Total OUT: 3175 mL    Total NET: -1815 mL      17 Jul 2018 07:01  -  17 Jul 2018 13:56  --------------------------------------------------------  IN:  Total IN: 0 mL    OUT:    Voided: 300 mL  Total OUT: 300 mL    Total NET: -300 mL           CAPILLARY BLOOD GLUCOSE                      Neuro:  NAD A and O x 3    Lungs:    CV:    ABD:     Ext:  b/l knee dsg C/D/I TA/GS/EHL/FHL 5/5 silt     LABS                        8.7    9.8   )-----------( 353      ( 17 Jul 2018 06:44 )             26.8                                07-17    134<L>  |  98  |  15  ----------------------------<  101<H>  4.2   |  24  |  0.78    Ca    9.0      17 Jul 2018 06:44        [ ] Other Labs  [ ] None ordered            Please check or [employer] [place] when present:  •  Heart Failure:    [ ] Acute        [ ]  Acute on Chronic        [ ] Chronic         [ ] Diastolic     [ ]  Combined    •  KEN:     [ ] ATN        [ ]  Renal medullary necrosis       [ ]  Renal cortical necrosis                  [ ] Other pathological Lesion:  •  CKD:  [ ] Stage I   [ ] Stage II  [ ] Stage III    [ ]Stage IV   [ ]  CKD V   [ ]  Other/Unspecified:    •  Abdominal Nutritional Status:   [ ] Malnutrition-See Nutrition note    [ ] Cachexia   [ ]  Other        [ ] Supplement ordered:            [ ] Morbid Obesity: BMI >=40         ASSESSMENT/PLAN:      STATUS POST: b/l TKA pod 7    CONTINUE:          [ ] PT wbat    [ ] DVT PPX- lovenox x 3 wk from surgery    [ ] Pain Mgt con't current regimen    [ ] Dispo plan- acute rehab    H/H con't to be stable.  Mild hyponatremia 134, will monitor.  Cleared for rehab anytime.

## 2018-07-17 NOTE — PROGRESS NOTE ADULT - NSHPATTENDINGPLANDISCUSS_GEN_ALL_CORE
Patient
Patient and his wife
nursing staff house staff consultants and Dr. Rosas
nursing staff house staff and Dr. Rosas
nursing staff house staff consultants and Dr. Rosas
nursing staff house staff consultants and Dr. Rosas
nursing staff physician assistant consultants and Dr. Rosas
nursing staff house staff consultants and Dr. Rosas

## 2018-07-17 NOTE — PROGRESS NOTE ADULT - PROBLEM SELECTOR PROBLEM 1
Primary osteoarthritis of both knees

## 2018-07-17 NOTE — PROGRESS NOTE ADULT - PROBLEM SELECTOR PROBLEM 5
Hiatal hernia
Anemia due to blood loss
Essential hypertension
Essential hypertension
Hiatal hernia
Gastroesophageal reflux disease without esophagitis

## 2018-07-17 NOTE — PROGRESS NOTE ADULT - ASSESSMENT
transfused one unit. no melena no hematemesis and HR lower seems to indicate no further bleed.  continue sucralfate and protonix  advance diet  PT and if tolerated fine but if symptomatic consider to transfuse one more unit prbc
53 year old male  S/P bilateral TKR surgery who is now doing well after anemia, tachycardia and hypotension due to esophageal bleeding.  The blood pressure and tachycardia are within acceptable limits.  The blood counts are holding above 7 but he would benefit with a hemoglobin above 8 or even 9 as he begins physical therapy and rehab to prevent orthostatic changes.
53 year old male with tachycardia and hypotension in the setting of anemia.  He is still anemic (7.4) and became dizzy with PT yesterday.  As per my note yesterday, I strongly recommend transfusion to at least >8 and preferably >9.  His heart rate at rest is in the 70 to 80 bpm range.  He increased to the high 90's and occasionally 100 bpm range with exertion.
EGD = large hiatal hernia, severe esophagitis with a Jayashree Segovia tear at distal esophagus  Recommend: monitor H/H  liquid diet today (clears and advance to full)  continue IV PPI for today and tomorrow am  carafate liquid 1 gm QID  reglan prn nausea
HR 90 H/H better post transfusion  had some dizziness with PT and that is why more blood given  hydrate more po to increase volume  diet as tolerated   PO pantoprazole + sucralfate more than sufficient
Hgb 8.9  had BM (expect to be heme positive for a few more days)  GI cleared to go to rehab
Patient currently has a tachycardia to 113/ min.  I would administer RBC transfusion as his Hb's are at 7gms. and may be responsible for his tachycardia.
for rehab  continue oral meds

## 2018-07-17 NOTE — PROGRESS NOTE ADULT - PROBLEM SELECTOR PLAN 1
Discussed with house staff and nursing staff.  Pain management, PT, incentive spirometer, DVT prophylaxis, repeat labs and discharge planning.
Discussed with house staff and nursing staff.  Pain management, PT, incentive spirometer, DVT prophylaxis, repeat labs and discharge planning.  For rehab.
Discussed with house staff and nursing staff.  Pain management, PT, incentive spirometer, DVT prophylaxis, repeat labs and discharge planning.

## 2018-07-17 NOTE — PROGRESS NOTE ADULT - SUBJECTIVE AND OBJECTIVE BOX
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  Reports moderate pain and swelling, decreased ROM and unsteady gait has gotten worse recently. Has had to resort to higher doses of NSAIDs  with resultant arterial hypertension.  X rays confirm diagnosis.  Symptoms worse with stairs and after prolonged immobility and increased over time.  Patient day #7 post op bilateral TKR with moderate knee pain and malaise.  Patient had rapid response several days ago and seen by CV Dr. Juarez and felt to have vasovagal episode.  Troponins and EKGs unremarkable.  No more hematemesis hgb now up to 8.9 after total of 3 units transfusion of PC.  Seen by GI Dr. Dior, started on IV protonix and carafate.  Endoscopy revealed Jayashree Segovia Tear, chronic gastritis and hiatal hernia.  Stol still heme positive and a little dizzy with PT but cleared for rehab by GI Dr. Dior.    PAST MEDICAL & SURGICAL HISTORY:  Hiatal hernia  Arthritis: both knees  [place] teeth extracted  History of inguinal hernia repair, bilateral: In childhood  History of elbow surgery: right      MEDICATIONS  (STANDING):  amLODIPine   Tablet 5 milliGRAM(s) Oral daily  bisacodyl Suppository 10 milliGRAM(s) Rectal daily  BUpivacaine liposome 1.3% Injectable (no eMAR) 20 milliLiter(s) Local Injection once  enoxaparin Injectable 40 milliGRAM(s) SubCutaneous daily  lisinopril 20 milliGRAM(s) Oral daily  pantoprazole    Tablet 40 milliGRAM(s) Oral before breakfast  sodium biphosphate Rectal Enema 1 Enema Rectal once  sucralfate suspension 1 Gram(s) Oral four times a day    MEDICATIONS  (PRN):  enalaprilat Injectable 1.25 milliGRAM(s) IV Push every 6 hours PRN SBP>150  HYDROmorphone  Injectable 0.5 milliGRAM(s) IV Push every 3 hours PRN Breakthrough pain  metoclopramide Injectable 10 milliGRAM(s) IV Push every 8 hours PRN nausea, vomiting  oxyCODONE    IR 10 milliGRAM(s) Oral every 3 hours PRN Severe Pain (7 - 10)  oxyCODONE    IR 5 milliGRAM(s) Oral every 3 hours PRN Moderate Pain (4 - 6)      Allergies    No Known Allergies    REVIEW OF SYSTEMS    General:  malaise	  Skin/Breast: normal  Ophthalmologic: negative  ENMT:	normal  Respiratory and Thorax: normal  Cardiovascular:	normal  Gastrointestinal:	normal  Genitourinary:	normal  Musculoskeletal: bilateral knee swelling   Neurological:	normal  Psychiatric:	normal  Hematology/Lymphatics:	 negative  Endocrine:	negative  Allergic/Immunologic:	negative      PHYSICAL EXAM:    Vital Signs Last 24 Hrs  T(C): 37.1 (17 Jul 2018 05:32), Max: 37.7 (16 Jul 2018 17:45)  T(F): 98.8 (17 Jul 2018 05:32), Max: 99.9 (16 Jul 2018 17:45)  HR: 81 (17 Jul 2018 05:32) (81 - 98)  BP: 135/76 (17 Jul 2018 05:32) (121/58 - 159/70)  BP(mean): --  RR: 16 (17 Jul 2018 05:32) (15 - 17)  SpO2: 96% (17 Jul 2018 05:32) (95% - 97%)    Constitutional: WDWNWM  Eyes: conj pale  ENMT: negative  Neck: supple  Breasts: not examined   Back: negative  Respiratory: clear to P&A  Cardiovascular: no MRGT or H  Gastrointestinal: normal bowel sounds  Genitourinary: neg  Rectal: not examined  Extremities: normal  Vascular: normal  Neurological: normal  Skin: negative  Lymph Nodes: negative  Musculoskeletal:   decreased ROM  bilateral knees  Psychiatric: anxiety                        8.9    8.8   )-----------( 324      ( 16 Jul 2018 06:38 )             27.9       07-16    139  |  101  |  15  ----------------------------<  99  4.1   |  25  |  0.76    Ca    8.7      16 Jul 2018 06:38

## 2018-07-24 DIAGNOSIS — K92.0 HEMATEMESIS: ICD-10-CM

## 2018-07-24 DIAGNOSIS — K44.9 DIAPHRAGMATIC HERNIA WITHOUT OBSTRUCTION OR GANGRENE: ICD-10-CM

## 2018-07-24 DIAGNOSIS — K22.6 GASTRO-ESOPHAGEAL LACERATION-HEMORRHAGE SYNDROME: ICD-10-CM

## 2018-07-24 DIAGNOSIS — M21.161 VARUS DEFORMITY, NOT ELSEWHERE CLASSIFIED, RIGHT KNEE: ICD-10-CM

## 2018-07-24 DIAGNOSIS — K20.9 ESOPHAGITIS, UNSPECIFIED: ICD-10-CM

## 2018-07-24 DIAGNOSIS — I10 ESSENTIAL (PRIMARY) HYPERTENSION: ICD-10-CM

## 2018-07-24 DIAGNOSIS — M21.162 VARUS DEFORMITY, NOT ELSEWHERE CLASSIFIED, LEFT KNEE: ICD-10-CM

## 2018-07-24 DIAGNOSIS — Z98.890 OTHER SPECIFIED POSTPROCEDURAL STATES: ICD-10-CM

## 2018-07-24 DIAGNOSIS — M17.0 BILATERAL PRIMARY OSTEOARTHRITIS OF KNEE: ICD-10-CM

## 2018-07-24 DIAGNOSIS — D62 ACUTE POSTHEMORRHAGIC ANEMIA: ICD-10-CM

## 2018-07-24 DIAGNOSIS — K29.50 UNSPECIFIED CHRONIC GASTRITIS WITHOUT BLEEDING: ICD-10-CM

## 2018-07-30 ENCOUNTER — APPOINTMENT (OUTPATIENT)
Dept: ORTHOPEDIC SURGERY | Facility: CLINIC | Age: 53
End: 2018-07-30
Payer: COMMERCIAL

## 2018-07-30 VITALS — WEIGHT: 285 LBS | HEIGHT: 74 IN | BODY MASS INDEX: 36.57 KG/M2

## 2018-07-30 PROBLEM — K44.9 DIAPHRAGMATIC HERNIA WITHOUT OBSTRUCTION OR GANGRENE: Chronic | Status: ACTIVE | Noted: 2018-06-27

## 2018-07-30 PROBLEM — M19.90 UNSPECIFIED OSTEOARTHRITIS, UNSPECIFIED SITE: Chronic | Status: ACTIVE | Noted: 2018-06-27

## 2018-07-30 PROCEDURE — 99024 POSTOP FOLLOW-UP VISIT: CPT

## 2018-08-02 ENCOUNTER — RX RENEWAL (OUTPATIENT)
Age: 53
End: 2018-08-02

## 2018-08-02 RX ORDER — OXYCODONE 5 MG/1
5 TABLET ORAL
Qty: 60 | Refills: 0 | Status: ACTIVE | COMMUNITY
Start: 2018-08-02 | End: 1900-01-01

## 2018-08-07 ENCOUNTER — INBOUND DOCUMENT (OUTPATIENT)
Age: 53
End: 2018-08-07

## 2018-08-13 ENCOUNTER — RX RENEWAL (OUTPATIENT)
Age: 53
End: 2018-08-13

## 2018-08-13 RX ORDER — DICLOFENAC SODIUM 10 MG/G
1 GEL TOPICAL DAILY
Qty: 2 | Refills: 2 | Status: ACTIVE | COMMUNITY
Start: 2018-08-13 | End: 1900-01-01

## 2018-08-13 RX ORDER — OXYCODONE 5 MG/1
5 TABLET ORAL
Qty: 60 | Refills: 0 | Status: ACTIVE | COMMUNITY
Start: 2018-08-13 | End: 1900-01-01

## 2018-08-20 ENCOUNTER — APPOINTMENT (OUTPATIENT)
Dept: ORTHOPEDIC SURGERY | Facility: CLINIC | Age: 53
End: 2018-08-20
Payer: COMMERCIAL

## 2018-08-20 VITALS — BODY MASS INDEX: 36.57 KG/M2 | WEIGHT: 285 LBS | HEIGHT: 74 IN

## 2018-08-20 DIAGNOSIS — Z96.653 AFTERCARE FOLLOWING JOINT REPLACEMENT SURGERY: ICD-10-CM

## 2018-08-20 DIAGNOSIS — Z47.1 AFTERCARE FOLLOWING JOINT REPLACEMENT SURGERY: ICD-10-CM

## 2018-08-20 PROCEDURE — 73562 X-RAY EXAM OF KNEE 3: CPT | Mod: RT

## 2018-08-20 PROCEDURE — 99024 POSTOP FOLLOW-UP VISIT: CPT

## 2018-08-20 RX ORDER — OXYCODONE 5 MG/1
5 TABLET ORAL
Qty: 60 | Refills: 0 | Status: ACTIVE | COMMUNITY
Start: 2018-08-20 | End: 1900-01-01

## 2018-09-07 ENCOUNTER — RX RENEWAL (OUTPATIENT)
Age: 53
End: 2018-09-07

## 2018-09-07 RX ORDER — OXYCODONE 5 MG/1
5 TABLET ORAL
Qty: 40 | Refills: 0 | Status: ACTIVE | COMMUNITY
Start: 2018-09-07 | End: 1900-01-01

## 2018-09-26 ENCOUNTER — RX RENEWAL (OUTPATIENT)
Age: 53
End: 2018-09-26

## 2018-09-26 RX ORDER — OXYCODONE 5 MG/1
5 TABLET ORAL EVERY 8 HOURS
Qty: 30 | Refills: 0 | Status: ACTIVE | COMMUNITY
Start: 2018-09-26 | End: 1900-01-01

## 2018-10-08 ENCOUNTER — APPOINTMENT (OUTPATIENT)
Dept: ORTHOPEDIC SURGERY | Facility: CLINIC | Age: 53
End: 2018-10-08
Payer: COMMERCIAL

## 2018-10-08 ENCOUNTER — FORM ENCOUNTER (OUTPATIENT)
Age: 53
End: 2018-10-08

## 2018-10-08 VITALS
SYSTOLIC BLOOD PRESSURE: 145 MMHG | WEIGHT: 285 LBS | HEIGHT: 74 IN | BODY MASS INDEX: 36.57 KG/M2 | DIASTOLIC BLOOD PRESSURE: 94 MMHG | HEART RATE: 93 BPM

## 2018-10-08 PROCEDURE — 73562 X-RAY EXAM OF KNEE 3: CPT | Mod: LT

## 2018-10-08 PROCEDURE — 99024 POSTOP FOLLOW-UP VISIT: CPT

## 2018-10-22 ENCOUNTER — RX RENEWAL (OUTPATIENT)
Age: 53
End: 2018-10-22

## 2018-10-22 RX ORDER — OXYCODONE 5 MG/1
5 TABLET ORAL
Qty: 40 | Refills: 0 | Status: ACTIVE | COMMUNITY
Start: 2018-10-22 | End: 1900-01-01

## 2019-01-14 ENCOUNTER — APPOINTMENT (OUTPATIENT)
Dept: ORTHOPEDIC SURGERY | Facility: CLINIC | Age: 54
End: 2019-01-14
Payer: COMMERCIAL

## 2019-01-14 VITALS
BODY MASS INDEX: 36.57 KG/M2 | SYSTOLIC BLOOD PRESSURE: 152 MMHG | WEIGHT: 285 LBS | HEIGHT: 74 IN | HEART RATE: 80 BPM | DIASTOLIC BLOOD PRESSURE: 97 MMHG

## 2019-01-14 PROCEDURE — 73562 X-RAY EXAM OF KNEE 3: CPT | Mod: RT

## 2019-01-14 PROCEDURE — 99213 OFFICE O/P EST LOW 20 MIN: CPT

## 2019-01-14 NOTE — HISTORY OF PRESENT ILLNESS
[All Other ROS Normal] : All other review of systems are negative except as noted [Joint Pain] : joint pain [FreeTextEntry1] : bilateral knee pain  [FreeTextEntry2] : 54 y/o male presents for follow up evaluation s/p bilateral TKR at 6 months. Patient is doing well and undergoing PT. He has returned to work at this time and has no major issues or complaints. Patient would is very happy and would like to continue PT at this time.

## 2019-01-14 NOTE — PHYSICAL EXAM
[FreeTextEntry2] : Left Knee\par Inspection: no effusion\par Wounds: healed midline incision\par Alignment: normal.\par Palpation: no specific tenderness on palpation.\par ROM: Active (in degrees) 0-125\par Ligamentous laxity (neg): negative ant. drawer test, negative post. drawer test, stable to varus stress test, stable to valgus stress test,\par Patellofemoral Alignment Test: Q angle-, normal.\par Muscle Test: good quad strength.\par Leg examination: calf is soft and non-tender.\par \par Right Knee\par Inspection: no effusion\par Wounds: healed midline incision\par Alignment: normal.\par Palpation: no specific tenderness on palpation.\par ROM: Active (in degrees) 0-125\par Ligamentous laxity (neg): negative ant. drawer test, negative post. drawer test, stable to varus stress test, stable to valgus stress test,\par Patellofemoral Alignment Test: Q angle-, normal.\par Muscle Test: good quad strength.\par Leg examination: calf is soft and non-tender.  [de-identified] : Left knee xrays,  AP, lateral, merchant view,  taken at the office today show: demonstrates a total knee replacement in satisfactory position and alignment. No evidence of loosening. The patella sits in a central position\par \par Right knee xrays,  AP, lateral, merchant view, taken at the office today show: demonstrates a total knee replacement in satisfactory position and alignment. No evidence of loosening. The patella sits in a central position

## 2019-01-14 NOTE — DISCUSSION/SUMMARY
[de-identified] : Patient is doing well and is very happy following their bilateral TKR at 6 months. They will continue activities as tolerated. P Patient will follow up with x-rays in 6 months.

## 2019-01-14 NOTE — ADDENDUM
[FreeTextEntry1] : This note was written by Cheryl Saeed on 01/14/2019 acting as scribe for Dr. Danny Rosas M.D.\par \par I, Dr. Danny Rosas M.D., have read and attest that all the information, medical decision making and discharge instructions within are true and accurate.\par

## 2019-07-15 ENCOUNTER — APPOINTMENT (OUTPATIENT)
Dept: ORTHOPEDIC SURGERY | Facility: CLINIC | Age: 54
End: 2019-07-15
Payer: COMMERCIAL

## 2019-07-28 ENCOUNTER — TRANSCRIPTION ENCOUNTER (OUTPATIENT)
Age: 54
End: 2019-07-28

## 2019-07-29 ENCOUNTER — APPOINTMENT (OUTPATIENT)
Dept: ORTHOPEDIC SURGERY | Facility: CLINIC | Age: 54
End: 2019-07-29
Payer: COMMERCIAL

## 2019-07-29 DIAGNOSIS — M17.0 BILATERAL PRIMARY OSTEOARTHRITIS OF KNEE: ICD-10-CM

## 2019-07-29 DIAGNOSIS — Z96.653 PRESENCE OF ARTIFICIAL KNEE JOINT, BILATERAL: ICD-10-CM

## 2019-07-29 PROCEDURE — 73562 X-RAY EXAM OF KNEE 3: CPT | Mod: 50

## 2019-07-29 PROCEDURE — 99213 OFFICE O/P EST LOW 20 MIN: CPT

## 2019-07-29 NOTE — HISTORY OF PRESENT ILLNESS
[de-identified] : 54 year old male following up status post bilateral TKR 1 year ago. Overall doing very well, works as home  and walking over 20,000 steps per day. Only complains of occasional soreness as well as mild discomfort with kneeling. Not taking any pain medication.

## 2019-07-29 NOTE — DISCUSSION/SUMMARY
[de-identified] : Patient is doing well following their bilateral TKR at 1 year. They will continue activities as tolerated. Patient will follow up with x-rays in 1 year

## 2019-07-29 NOTE — ADDENDUM
[FreeTextEntry1] : This note was written by Cheryl Saeed on 07/29/2019 acting as scribe for Dr. Danny Rosas M.D.\par \par I, Dr. Danny Rosas M.D., have read and attest that all the information, medical decision making and discharge instructions within are true and accurate.\par

## 2019-07-29 NOTE — PHYSICAL EXAM
[de-identified] : Left Knee\par Inspection: no effusion\par Wounds: healed midline incision\par Alignment: normal.\par Palpation: no specific tenderness on palpation.\par ROM: Active (in degrees) 0-125\par Ligamentous laxity (neg): negative ant. drawer test, negative post. drawer test, stable to varus stress test, stable to valgus stress test,\par Patellofemoral Alignment Test: Q angle-, normal.\par Muscle Test: good quad strength.\par Leg examination: calf is soft and non-tender.\par \par Right Knee\par Inspection: no effusion\par Wounds: healed midline incision\par Alignment: normal.\par Palpation: no specific tenderness on palpation.\par ROM: Active (in degrees) 0-125\par Ligamentous laxity (neg): negative ant. drawer test, negative post. drawer test, stable to varus stress test, stable to valgus stress test,\par Patellofemoral Alignment Test: Q angle-, normal.\par Muscle Test: good quad strength.\par Leg examination: calf is soft and non-tender.  [de-identified] : Left knee xrays,  AP, lateral, merchant view,  taken at the office today demonstrates a total knee replacement in satisfactory position and alignment. No evidence of loosening. The patella sits in a central position, zone 1 radiolucency beneath medial tibial tray\par \par Right knee xrays,  AP, lateral, merchant view, taken at the office today demonstrates a total knee replacement in satisfactory position and alignment. No evidence of loosening. The patella sits in a central position, zone 1 radiolucency beneath medial tibial tray

## 2020-07-21 ENCOUNTER — FORM ENCOUNTER (OUTPATIENT)
Age: 55
End: 2020-07-21

## 2020-08-17 ENCOUNTER — APPOINTMENT (OUTPATIENT)
Dept: ORTHOPEDIC SURGERY | Facility: CLINIC | Age: 55
End: 2020-08-17

## 2023-09-12 NOTE — H&P PST ADULT - SPO2 (%)
98 Infliximab Counseling:  I discussed with the patient the risks of infliximab including but not limited to myelosuppression, immunosuppression, autoimmune hepatitis, demyelinating diseases, lymphoma, and serious infections.  The patient understands that monitoring is required including a PPD at baseline and must alert us or the primary physician if symptoms of infection or other concerning signs are noted.

## 2023-09-15 ASSESSMENT — KOOS JR
TWISING OR PIVOTING ON KNEE: MODERATE
STRAIGHTENING KNEE FULLY: MILD
STANDING UPRIGHT: MILD
TWISING OR PIVOTING ON KNEE: SEVERE
RISING FROM SITTING: SEVERE
IMPORTED KOOS JR SCORE: 59.38
TWISING OR PIVOTING ON KNEE: MODERATE
GOING UP OR DOWN STAIRS: MODERATE
IMPORTED KOOS JR SCORE: 65.99
IMPORTED KOOS JR SCORE: 61.58
IMPORTED KOOS JR SCORE: 57.14
KOOS JR RAW SCORE: 16
KOOS JR RAW SCORE: 8
BENDING TO THE FLOOR TO PICK UP OBJECT: MODERATE
KOOS JR RAW SCORE: 16
RISING FROM SITTING: MODERATE
KOOS JR RAW SCORE: 11
IMPORTED FORM: YES
STRAIGHTENING KNEE FULLY: MILD
IMPORTED LATERALITY: RIGHT
BENDING TO THE FLOOR TO PICK UP OBJECT: MILD
STRAIGHTENING KNEE FULLY: MILD
RISING FROM SITTING: MODERATE
KOOS JR RAW SCORE: 12
KOOS JR RAW SCORE: 9
STANDING UPRIGHT: MILD
IMPORTED LATERALITY: RIGHT
IMPORTED KOOS JR SCORE: 68.28
TWISING OR PIVOTING ON KNEE: MILD
RISING FROM SITTING: SEVERE
IMPORTED KOOS JR SCORE: 68.28
GOING UP OR DOWN STAIRS: MILD
IMPORTED KOOS JR SCORE: 59.38
IMPORTED KOOS JR SCORE: 52.47
KOOS JR RAW SCORE: 14
HOW SEVERE IS YOUR KNEE STIFFNESS AFTER FIRST WAKING IN MORNING: MODERATE
IMPORTED KOOS JR SCORE: 61.58
IMPORTED LATERALITY: LEFT
RISING FROM SITTING: SEVERE
HOW SEVERE IS YOUR KNEE STIFFNESS AFTER FIRST WAKING IN MORNING: MODERATE
KOOS JR RAW SCORE: 11
IMPORTED KOOS JR SCORE: 52.47
HOW SEVERE IS YOUR KNEE STIFFNESS AFTER FIRST WAKING IN MORNING: MILD
GOING UP OR DOWN STAIRS: MILD
GOING UP OR DOWN STAIRS: MILD
TWISING OR PIVOTING ON KNEE: MILD
IMPORTED KOOS JR SCORE: 52.47
IMPORTED KOOS JR SCORE: 65.99
IMPORTED KOOS JR SCORE: 47.49
KOOS JR RAW SCORE: 7
KOOS JR RAW SCORE: 7
IMPORTED KOOS JR SCORE: 63.78
KOOS JR RAW SCORE: 7
RISING FROM SITTING: MILD
KOOS JR RAW SCORE: 10
IMPORTED KOOS JR SCORE: 59.38
KOOS JR RAW SCORE: 9
HOW SEVERE IS YOUR KNEE STIFFNESS AFTER FIRST WAKING IN MORNING: SEVERE
IMPORTED KOOS JR SCORE: 68.28
HOW SEVERE IS YOUR KNEE STIFFNESS AFTER FIRST WAKING IN MORNING: MILD
KOOS JR RAW SCORE: 14
KOOS JR RAW SCORE: 8
KOOS JR RAW SCORE: 8
BENDING TO THE FLOOR TO PICK UP OBJECT: SEVERE
TWISING OR PIVOTING ON KNEE: SEVERE
KOOS JR RAW SCORE: 16
IMPORTED LATERALITY: LEFT
BENDING TO THE FLOOR TO PICK UP OBJECT: MODERATE
IMPORTED LATERALITY: LEFT
KOOS JR RAW SCORE: 12
TWISING OR PIVOTING ON KNEE: MODERATE
IMPORTED KOOS JR SCORE: 63.78
IMPORTED KOOS JR SCORE: 63.78
HOW SEVERE IS YOUR KNEE STIFFNESS AFTER FIRST WAKING IN MORNING: MILD
IMPORTED FORM: YES
STANDING UPRIGHT: MILD
HOW SEVERE IS YOUR KNEE STIFFNESS AFTER FIRST WAKING IN MORNING: SEVERE
TWISING OR PIVOTING ON KNEE: SEVERE
RISING FROM SITTING: MODERATE
GOING UP OR DOWN STAIRS: MODERATE
RISING FROM SITTING: MILD
KOOS JR RAW SCORE: 10
KOOS JR RAW SCORE: 9
IMPORTED KOOS JR SCORE: 61.58
BENDING TO THE FLOOR TO PICK UP OBJECT: SEVERE
KOOS JR RAW SCORE: 12
GOING UP OR DOWN STAIRS: SEVERE
RISING FROM SITTING: MILD
KOOS JR RAW SCORE: 10
IMPORTED FORM: YES
BENDING TO THE FLOOR TO PICK UP OBJECT: MILD
KOOS JR RAW SCORE: 11
BENDING TO THE FLOOR TO PICK UP OBJECT: MILD
IMPORTED LATERALITY: RIGHT
IMPORTED KOOS JR SCORE: 47.49
KOOS JR RAW SCORE: 14
BENDING TO THE FLOOR TO PICK UP OBJECT: SEVERE
GOING UP OR DOWN STAIRS: MODERATE
IMPORTED KOOS JR SCORE: 65.99
HOW SEVERE IS YOUR KNEE STIFFNESS AFTER FIRST WAKING IN MORNING: MODERATE
TWISING OR PIVOTING ON KNEE: MILD
GOING UP OR DOWN STAIRS: SEVERE
HOW SEVERE IS YOUR KNEE STIFFNESS AFTER FIRST WAKING IN MORNING: SEVERE
BENDING TO THE FLOOR TO PICK UP OBJECT: MODERATE
IMPORTED KOOS JR SCORE: 57.14
GOING UP OR DOWN STAIRS: SEVERE
IMPORTED KOOS JR SCORE: 47.49
IMPORTED KOOS JR SCORE: 57.14

## 2025-01-01 NOTE — PATIENT PROFILE ADULT. - SOURCE OF INFORMATION, PROFILE
Patient : Hever Ortega Age: 64 year old Sex: male   MRN: [id number] Encounter Date: 12/31/2024      History     Chief Complaint   Patient presents with    Shortness of Breath     HPI  64-year-old male presenting with shortness of breath, cough, fever, chest heaviness.  He reports his symptoms are the same as when he was recently admitted for pneumonia, had been feeling better, symptoms returned yesterday.  No abdominal pain/vomiting.    Allergies   Allergen Reactions    Zosyn [Piperacillin-Tazobactam In D5w] RASH     generalized maculopapular erythematous rash.  Some of the rash lesions are well-circumscribed and appear discoid (7/16/24)    Chlorhexidine HIVES and RASH     Reaction to CHG bath wipes    Tramadol      Cold sweats       Current Discharge Medication List        Prior to Admission Medications    Details   promethazine-dextromethorphan (PROMETHAZINE-DM) 6.25-15 MG/5ML syrup Take 5 mLs by mouth 4 times daily as needed for Cough.      Cyanocobalamin (B-12) 250 MCG Tab Take 250 mcg by mouth daily.  Qty: 90 tablet, Refills: 3      acetaminophen (TYLENOL) 325 MG tablet TAKE 2 TABLETS BY MOUTH EVERY 4 HOURS AS NEEDED FOR PAIN.  Qty: 120 tablet, Refills: 0      umeclidinium bromide (INCRUSE ELLIPTA) 62.5 MCG/ACT inhaler Inhale 1 puff into the lungs daily.  Qty: 30 each, Refills: 11      albuterol (VENTOLIN) (2.5 MG/3ML) 0.083% nebulizer solution Take 3 mLs by nebulization every 6 hours as needed for Wheezing.  Qty: 150 mL, Refills: 12      metoPROLOL tartrate (LOPRESSOR) 25 MG tablet Take 0.5 tablets by mouth every 12 hours.  Qty: 30 tablet, Refills: 1      rosuvastatin (CRESTOR) 10 MG tablet Take 1 tablet by mouth daily. Begin taking on October 29, 2024.  Qty: 90 tablet, Refills: 1      pregabalin (LYRICA) 75 MG capsule Take 1 capsule by mouth daily. Do not drive while taking this medication  Qty: 90 capsule, Refills: 1      famotidine (PEPCID) 20 MG tablet Take 1 tablet by mouth daily.  Qty: 90 tablet, Refills: 3       metFORMIN (GLUCOPHAGE-XR) 500 MG 24 hr tablet Take 1 tablet by mouth in the morning and 1 tablet in the evening.  Qty: 180 tablet, Refills: 0      trazodone (DESYREL) 150 MG tablet Take 1 tablet by mouth nightly.  Qty: 90 tablet, Refills: 3      albuterol 108 (90 Base) MCG/ACT inhaler USE 2 INHALATIONS EVERY 4 HOURS AS NEEDED FOR SHORTNESS OF BREATH OR WHEEZING  Qty: 25.5 g, Refills: 3      folic acid (FOLATE) 1 MG tablet TAKE 1 TABLET DAILY  Qty: 90 tablet, Refills: 0      vitamin D3 (CHOLECALCIFEROL) 1.25 mg(50,000 units) capsule Take 1 capsule by mouth every 7 days.  Qty: 12 capsule, Refills: 1      blood glucose (OneTouch Ultra) test strip TEST BLOOD SUGAR 3 TIMES DAILY. DIAGNOSIS: E11.65. METER: INSURANCE PREFERENCE  Qty: 300 strip, Refills: 3      calcitRIOL (ROCALTROL) 0.5 MCG capsule Take 1 capsule by mouth 3 days a week.  Qty: 36 capsule, Refills: 1      thiamine (VITAMIN B1) 100 MG tablet Take 1 tablet by mouth daily.  Qty: 14 tablet, Refills: 0      Blood Glucose Monitoring Suppl (ONE TOUCH ULTRA 2) w/Device Kit 1 kit by Other route as directed.  Qty: 1 kit, Refills: 0             Past Medical History:   Diagnosis Date    Allergic rhinitis     Arthritis     Benign neoplasm of larynx 2012    Bronchitis     Cellulitis     COPD (chronic obstructive pulmonary disease)  (CMD)     Depression     Diabetes mellitus  (CMD)     Enterococcal bacteremia 08/06/2024    ETOH abuse     in treatment 6/27/22    Fracture     Right 5th Metacarpal    Gastroesophageal reflux disease     H. pylori infection     Hepatitis 2013    hepatitis C    History of drug use     History IV drug use.Patient denies today 3/9/17    Larynx polyp 2012    Liver disease     Hep C    Obstructive chronic bronchitis without exacerbation  (CMD) 08/25/2015    Peyronie's disease     Sleep apnea     No Cpap    Vocal cord papilloma virus        Past Surgical History:   Procedure Laterality Date    COLONOSCOPY  01/04/2013    Dr. Ireland, repeat in 10  yrs.    COLONOSCOPY  04/13/2021    Dr. Farfan    COLONOSCOPY W/ POLYPECTOMY  04/13/2021    repeat in 5 yrs per Dr. Farfan-polyps    ERCP  08/20/2024    ESOPHAGOGASTRODUODENOSCOPY (EGD)  04/13/2021    + H pylori     ESOPHAGOGASTRODUODENOSCOPY (EGD)  08/20/2024    GI ENDOSCOPIC ULTRASOUND  08/20/2024    HEMORRHOIDECTOMY,EXTERNAL      KNEE SCOPE/SURG/INCOND FX AID+FIXAT Left     LAPAROSCOPY, CHOLECYSTECTOMY  08/21/2024    LARYNGOSCOPY,DIRCT,OP SCOP,EXC TUMR      2012, 2013, 2014, 2015, 2016, 2020, 2022, 2023    OTHER SURGICAL HISTORY      Vocal Cord Stripping-Many (last 3/14/17)    SINUS SURGERY      TONSILLECTOMY      UPPER GASTROINTESTINAL ENDOSCOPY  04/13/2021    Dr. Farfan       Family History   Problem Relation Age of Onset    Cancer Mother     Cancer Father        Social History     Tobacco Use    Smoking status: Every Day     Current packs/day: 0.80     Average packs/day: 0.8 packs/day for 46.6 years (37.2 ttl pk-yrs)     Types: Cigarettes     Start date: 6/13/1978    Smokeless tobacco: Current     Types: Chew    Tobacco comments:     cut down to 0.5 packs after 40 years. Declines quit information   Vaping Use    Vaping status: never used   Substance Use Topics    Alcohol use: Not Currently     Alcohol/week: 30.0 standard drinks of alcohol     Types: 30 Glasses of wine per week     Comment: 2 bottles of vodka per week -Stopped 4/27/22 in treatment    Drug use: Not Currently     Types: IV     Comment: early 1990's       E-cigarette/Vaping    E-Cigarette/Vaping Use Never Used      E-Cigarette/Vaping Substances & Devices       Review of Systems   All other systems reviewed and are negative.      Physical Exam     ED Triage Vitals [12/31/24 0957]   ED Triage Vitals Group      Temp (!) 101.9 °F (38.8 °C)      Heart Rate (!) 111      Resp (!) 22      /56      SpO2 95 %      EtCO2 mmHg       Height 5' 9\" (1.753 m)      Weight 207 lb 12.8 oz (94.3 kg)      Weight Scale Used Standing scale      BMI (Calculated) 30.69       IBW/kg (Calculated) 70.7       Physical Exam  Vitals and nursing note reviewed.   Constitutional:       General: He is not in acute distress.     Appearance: Normal appearance. He is well-developed. He is not ill-appearing, toxic-appearing or diaphoretic.   HENT:      Head: Normocephalic and atraumatic.      Right Ear: External ear normal.      Left Ear: External ear normal.      Nose: Nose normal.      Mouth/Throat:      Mouth: Mucous membranes are moist.   Eyes:      General: No scleral icterus.        Right eye: No discharge.         Left eye: No discharge.      Extraocular Movements: Extraocular movements intact.      Conjunctiva/sclera: Conjunctivae normal.      Pupils: Pupils are equal, round, and reactive to light.   Neck:      Trachea: No tracheal deviation.   Cardiovascular:      Rate and Rhythm: Regular rhythm. Tachycardia present.      Pulses: Normal pulses.      Heart sounds: Normal heart sounds.      No friction rub. No gallop.   Pulmonary:      Effort: Pulmonary effort is normal. No respiratory distress.      Breath sounds: No stridor. Wheezing (Bilateral right greater than left) present. No rhonchi or rales.   Chest:      Chest wall: No tenderness.   Abdominal:      General: There is no distension.      Palpations: Abdomen is soft. There is no mass.      Tenderness: There is no abdominal tenderness. There is no guarding or rebound.   Musculoskeletal:         General: No swelling, tenderness, deformity or signs of injury. Normal range of motion.      Cervical back: Normal range of motion and neck supple. No rigidity.      Right lower leg: No edema.      Left lower leg: No edema.      Comments: No calf swelling/ttp b/l   Skin:     General: Skin is warm and dry.      Capillary Refill: Capillary refill takes less than 2 seconds.      Coloration: Skin is not jaundiced or pale.      Findings: No bruising, erythema, lesion or rash.   Neurological:      General: No focal deficit present.      Mental  Status: He is alert and oriented to person, place, and time.      Cranial Nerves: No cranial nerve deficit.      Sensory: No sensory deficit.      Motor: No weakness or abnormal muscle tone.      Coordination: Coordination normal.         ED Course     Procedures    Lab Results     Results for orders placed or performed during the hospital encounter of 12/31/24   Light Green Top    Specimen: Blood, Venous   Result Value Ref Range    Extra Tube Hold for Add Ons    Lavender Top    Specimen: Blood, Venous   Result Value Ref Range    Extra Tube Hold for Add Ons    Grey Top Tube    Specimen: Blood, Venous   Result Value Ref Range    Extra Tube Hold for Add Ons    Light Blue Top    Specimen: Blood, Venous   Result Value Ref Range    Extra Tube Hold for Add Ons    Basic Metabolic Panel    Specimen: Blood, Venous   Result Value Ref Range    Fasting Status      Sodium 127 (L) 135 - 145 mmol/L    Potassium 4.2 3.4 - 5.1 mmol/L    Chloride 91 (L) 97 - 110 mmol/L    Carbon Dioxide 26 21 - 32 mmol/L    Anion Gap 14 7 - 19 mmol/L    Glucose 143 (H) 70 - 99 mg/dL    BUN 17 6 - 20 mg/dL    Creatinine 1.39 (H) 0.67 - 1.17 mg/dL    Glomerular Filtration Rate 56 (L) >=60    BUN/Cr 12 7 - 25    Calcium 9.1 8.4 - 10.2 mg/dL   TROPONIN I, HIGH SENSITIVITY    Specimen: Blood, Venous   Result Value Ref Range    Troponin I, High Sensitivity 13 <77 ng/L   NT proBNP    Specimen: Blood, Venous   Result Value Ref Range    NT-proBNP 442 (H) <=125 pg/mL   Hepatic Function Panel    Specimen: Blood, Venous   Result Value Ref Range    Albumin 2.9 (L) 3.4 - 5.0 g/dL    Bilirubin, Total 1.7 (H) 0.2 - 1.0 mg/dL    Bilirubin, Direct 1.1 (H) 0.0 - 0.2 mg/dL    Alkaline Phosphatase 261 (H) 45 - 117 Units/L    GPT/ALT 35 <64 Units/L    GOT/AST 27 <=37 Units/L    Protein, Total 8.0 6.4 - 8.2 g/dL   Lactic Acid Venous With Reflex    Specimen: Blood, Venous   Result Value Ref Range    Lactate, Venous 1.4 0.0 - 2.0 mmol/L   Magnesium    Specimen: Blood, Venous    Result Value Ref Range    Magnesium 2.4 1.7 - 2.4 mg/dL   Procalcitonin    Specimen: Blood, Venous   Result Value Ref Range    Procalcitonin 1.35 (H) <0.10 ng/mL   Sedimentation Rate    Specimen: Blood, Venous   Result Value Ref Range    RBC Sedimentation Rate >120 (H) 0 - 20 mm/hr   C Reactive Protein    Specimen: Blood, Venous   Result Value Ref Range    C-Reactive Protein 290.2 (H) <10.0 mg/L   COVID/Flu/RSV panel    Specimen: Nasal, Mid-turbinate; Swab   Result Value Ref Range    Rapid SARS-COV-2 by PCR Not Detected Not Detected / Detected / Presumptive Positive / Inhibitors present    Influenza A by PCR Not Detected Not Detected    Influenza B by PCR Not Detected Not Detected    RSV BY PCR Not Detected Not Detected    Isolation Guidelines      Procedural Comment     CBC with Automated Differential (performable only)    Specimen: Blood, Venous   Result Value Ref Range    WBC 7.9 4.2 - 11.0 K/mcL    RBC 3.57 (L) 4.50 - 5.90 mil/mcL    HGB 10.0 (L) 13.0 - 17.0 g/dL    HCT 31.2 (L) 39.0 - 51.0 %    MCV 87.4 78.0 - 100.0 fl    MCH 28.0 26.0 - 34.0 pg    MCHC 32.1 32.0 - 36.5 g/dL    RDW-CV 18.9 (H) 11.0 - 15.0 %    RDW-SD 60.3 (H) 39.0 - 50.0 fL     140 - 450 K/mcL    NRBC 0 <=0 /100 WBC    Neutrophil, Percent 77 %    Lymphocytes, Percent 14 %    Mono, Percent 8 %    Eosinophils, Percent 1 %    Basophils, Percent 0 %    Immature Granulocytes 0 %    Absolute Neutrophils 6.1 1.8 - 7.7 K/mcL    Absolute Lymphocytes 1.1 1.0 - 4.0 K/mcL    Absolute Monocytes 0.6 0.3 - 0.9 K/mcL    Absolute Eosinophils  0.1 0.0 - 0.5 K/mcL    Absolute Basophils 0.0 0.0 - 0.3 K/mcL    Absolute Immature Granulocytes 0.0 0.0 - 0.2 K/mcL   Lactic Acid Venous With Reflex    Specimen: Blood, Venous   Result Value Ref Range    Lactate, Venous 0.7 0.0 - 2.0 mmol/L   Urinalysis & Reflex Microscopy With Culture If Indicated    Specimen: Urine clean catch   Result Value Ref Range    COLOR, URINALYSIS Yellow     APPEARANCE, URINALYSIS Cloudy      GLUCOSE, URINALYSIS Negative Negative mg/dL    BILIRUBIN, URINALYSIS Positive (A) Negative    KETONES, URINALYSIS Negative Negative mg/dL    SPECIFIC GRAVITY, URINALYSIS 1.024 1.005 - 1.030    OCCULT BLOOD, URINALYSIS Trace (A) Negative    PH, URINALYSIS 6.0 5.0 - 7.0    PROTEIN, URINALYSIS 100 (A) Negative mg/dL    UROBILINOGEN, URINALYSIS 8.0 (A) 0.2, 1.0 mg/dL    NITRITE, URINALYSIS Negative Negative    LEUKOCYTE ESTERASE, URINALYSIS Negative Negative    SQUAMOUS EPITHELIAL, URINALYSIS 1 to 5 None Seen, 1 to 5 /hpf    ERYTHROCYTES, URINALYSIS 1 to 2 None Seen, 1 to 2 /hpf    LEUKOCYTES, URINALYSIS 11 to 25 (A) None Seen, 1 to 5 /hpf    BACTERIA, URINALYSIS None Seen None Seen /hpf    HYALINE CASTS, URINALYSIS None Seen None Seen, 1 to 5 /lpf    MUCUS Present    D Dimer, Quantitative    Specimen: Blood, Venous   Result Value Ref Range    D Dimer, Quantitative 1.21 (H) <0.57 mg/L (FEU)   Blood Culture    Specimen: Blood, Venous   Result Value Ref Range    Culture, Blood or Bone Marrow No Growth <24 hours    Blood Culture    Specimen: Blood, Venous   Result Value Ref Range    Culture, Blood or Bone Marrow No Growth <24 hours    Rapid Respiratory Pathogen PCR    Specimen: Nasopharyngeal Swab   Result Value Ref Range    Adenovirus Not Detected Not Detected    Bordetella Parapertussis Not Detected Not Detected    Bordetella pertussis Not Detected Not Detected    Chlamydophila pneumoniae Not Detected Not Detected    Coronavirus, 229E Not Detected Not Detected    Coronavirus, HKU1 Not Detected Not Detected    Coronavirus, NL63 Not Detected Not Detected    Coronavirus, OC43 Not Detected Not Detected    Influenza A Not Detected Not Detected    Influenza B Not Detected Not Detected    Metapneumovirus Not Detected Not Detected    Mycoplasma pneumoniae Not Detected Not Detected    Parainfluenza 1 Not Detected Not Detected    Parainfluenza 2 Not Detected Not Detected    Parainfluenza 3 Not Detected Not Detected     Parainfluenza 4 Not Detected Not Detected    Respiratory Syncytial virus Not Detected Not Detected    Rhinovirus/Enterovirus Not Detected Not Detected    SARS-CoV-2 by PCR Not Detected Not Detected / Detected / Inhibitors Present       EKG Results     EKG Interpretation  Rate: 108  Rhythm: sinus tachycardia   Abnormality: No significant ST/T wave changes    EKG tracing interpreted by ED physician    Radiology Results     Imaging Results              CT ABDOMEN PELVIS WO CONTRAST (Final result)  Result time 12/31/24 14:24:19      Final result                   Impression:    IMPRESSION:    1. No evidence for infection, nephrolithiasis, ureterolithiasis,  appendicitis, diverticulitis, pancreatitis or acute process.          2. Hepatic cirrhosis without ascites      Electronically Signed by: Santiago Cooper MD  Signed on: 12/31/2024 2:24 PM  Created on Workstation ID: [id number]  Signed on Workstation ID: [id number]               Narrative:    EXAMINATION:  CT ABDOMEN PELVIS WO CONTRAST    TECHNIQUE: Contiguous axial noncontrast images of the abdomen and pelvis  were obtained from the lung bases to the level of the lesser trochanters  using routine protocol.    One or more of the following dose reduction techniques were used: automated  exposure control, adjustment of the mA and/or kV according to patient size,  use of iterative reconstruction.    CLINICAL HISTORY:  64 years old Male presents with       sepsis unknown source          COMPARISON: None     LUNG BASES:    The imaged lung bases demonstrate no acute process.        CT ABDOMEN:    Evaluation of the solid abdominal viscera is markedly limited by lack of IV  contrast.      LYMPH NODES:  No evidence of increased by size criteria lymph nodes within  limitation of noncontrast technique    LIVER AND BILIARY SYSTEM: There is no hepatic steatosis or evidence for  cirrhosis.    GALLBLADDER: The gallbladder has been surgically removed     PANCREAS: No pancreatic lesion  is identified, within limitation of  non-contrast technique.    SPLEEN: No splenomegaly.  ADRENALS: No suspicious adrenal mass lesions.     KIDNEYS AND URETERS:      There is no hydronephrosis, nephrolithiasis or ureterolithiasis.       CT PELVIS:    The BLADDER demonstrates no suspicious wall thickening or polypoid mass  lesion.      No pathologic masses are identified.    BOWEL: Evaluation of the bowel is limited secondary to non administration  of oral contrast.      No evidence of appendicitis      No small or large bowel obstruction.      No evidence for  diverticulitis.     ADDITIONAL MISCELLANEOUS FINDINGS:    No abdominal aortic aneurysm.    OSSEOUS STRUCTURES:    No suspicious lytic or blastic osseous lesions.      No significant vertebral body compression fractures.                                       US LIVER GALLBLADDER PANCREAS (RUQ) (Final result)  Result time 12/31/24 13:09:36      Final result                   Impression:    IMPRESSION:  1.  Evidence of mild hepatomegaly. No focal liver lesion. No evidence for  fatty infiltration.  2.  Status post cholecystectomy.  3.  Suboptimal visualization of the pancreas.    Electronically Signed by: Amador Smith MD  Signed on: 12/31/2024 1:09 PM  Created on Workstation ID: [id number]  Signed on Workstation ID: [id number]               Narrative:    EXAM: US LIVER GALLBLADDER PANCREAS (RUQ)    HISTORY: abnormal LFTs     COMPARISON: 8/3/2021.    TECHNIQUE: Grayscale as well as color and spectral doppler imaging was  performed of the right upper quadrant.    FINDINGS:     LIVER: Liver parenchymal echogenicity is within normal limits. No focal  liver lesion. However, there is evidence of hepatomegaly, and on a  9/18/2024 CT abdomen mild hepatomegaly was present with the liver measuring  17 cm in midclavicular height on that study.  ASCITES: None.  PORTAL VEIN: Hepatopetal flow. Velocity 38 cm/s.  GALLBLADDER: Status post cholecystectomy.  BILIARY: No dilation or  visualized obstruction. Common bile duct diameter  is within normal limits at 5 mm.    PANCREAS: Not optimally visualized due to the patient's body habitus and  shadowing bowel gas.  RIGHT KIDNEY: Grossly unremarkable, no hydronephrosis.   IVC: Visualized portion is patent.                                       CT CHEST WO CONTRAST (Final result)  Result time 12/31/24 11:20:25      Final result                   Impression:    IMPRESSION:    1. There is no acute airspace disease. There is chronic bandlike  groundglass opacification in the right lower lobe posterior basal segment.  This is unchanged compared to recent CT exams including September 2024.  This may represent an area of chronic atelectasis, scarring or low-grade  inflammation.  2. There is a tiny pulmonary nodule in the right upper lobe. Recommend  continued CT lung screening program.  3. There are stable mildly prominent mediastinal lymph nodes which could be  reactive in nature.  4. There is pneumobilia. This is likely due to prior cholecystectomy in  August 2024. There is splenomegaly.    Electronically Signed by: Hany Flores MD  Signed on: 12/31/2024 11:20 AM  Created on Workstation ID: [id number]  Signed on Workstation ID: [id number]               Narrative:    EXAM: CT CHEST WO CONTRAST     DATE OF EXAM: 12/31/2024 10:27 AM.    CLINICAL HISTORY:     .  recurrent pneumonia.    COMPARISON: 12/10/2024, 5/21/2024    TECHNIQUE:    Noncontrast 2 mm sections through the chest were obtained without IV  contrast. Coronal and sagittal reformats.    FINDINGS:    Devices: None.  Thoracic aorta: The thoracic aorta is normal in caliber.   Heart and Coronary Arteries: Mild coronary arterial calcifications..   Lymph nodes: There are superior right paratracheal lymph nodes which  measure up to 2 mm in short axis (series 2, image 49), previously 1.2 cm as  well. There is a precarinal lymph node which measures 1.1 cm in short axis,  unchanged (series 2, image  58). There are small bilateral hilar lymph nodes  measuring up to 7 mm in short axis. There is a subcarinal lymph node which  measures 1 cm short axis, previously 8 mm. There are AP window lymph nodes  which measure up to 8 mm in short axis. There are no enlarged axillary  lymph nodes.  Pleura: There is a stable punctate pulmonary nodule in the right upper lobe  (series 2, image 54). There is groundglass opacification in the right lower  lobe (series 2, image 110) which has a similar appearance to prior CT exams  including 9/18/2024, 12/10/2024. Given chronicity this is more likely to  represent an area of chronic atelectasis and/or scarring. There is no acute  airspace disease or consolidation..  Lungs: The lungs are clear.  Pulmonary arteries: The main pulmonary arteries are normal in caliber.  Bones: There are no bone lesions with aggressive features.  Central airway: The central airways are clear.  Other: There is pneumobilia. The spleen is prominent in size measuring 15.3  cm in AP dimension. There are rhoda hepatis and portacaval lymph nodes  which measure up to 1.1 cm in short axis. These were also present on prior  exam..                                      ED Medication Orders (From admission, onward)      Ordered Start     Status Ordering Provider    12/31/24 1605 12/31/24 1606  acetaminophen (TYLENOL) tablet 650 mg  ONCE         Last MAR action: Given MICHELL BRYANT    12/31/24 1324 12/31/24 1325  sodium chloride (NORMAL SALINE) 0.9 % bolus 1,000 mL  ONCE         Last MAR action: Completed MICHELL BRYANT    12/31/24 1131 12/31/24 1132  metroNIDAZOLE (FLAGYL) premix IVPB 500 mg  (Unknown Source)  ONCE         Last MAR action: Completed MICHELL BRYANT    12/31/24 1131 12/31/24 1132  ceFEPIme (MAXIPIME) 2,000 mg in sodium chloride 0.9 % 100 mL IVPB  (Unknown Source)  ONCE         Last MAR action: Completed MICHELL BRYANT    12/31/24 1005 12/31/24 1006  acetaminophen (TYLENOL) tablet 650 mg  ONCE          Last MAR action: Given MICHELL BRYANT    12/31/24 1005 12/31/24 1006  sodium chloride (NORMAL SALINE) 0.9 % bolus 1,000 mL  ONCE         Last MAR action: Completed MICHELL BRYANT    12/31/24 1005 12/31/24 1006  albuterol inhaler 4 puff  ONCE         Last MAR action: Given MICHELL BRYANT                 Medical Decision Making  64-year-old male presenting with above.  Broad differential diagnosis considered, old records reviewed.  EKG, CT chest, labs ordered.  Albuterol for wheezing wheezing.  IV fluids, Tylenol for fever.    Labs reviewed, broad-spectrum antibiotics ordered.  Right upper quadrant ultrasound added.    Results reviewed and discussed with patient/family at bedside.  No definite etiology of findings, CT abdomen pelvis added.    Results reviewed and discussed with patient/family again.  They are in agreement with recommendation for admission for further evaluation and management.  Case discussed with hospitalist, accepts patient for admission, requests addition of a D-dimer which was added but will be managed when it  results as inpatient.    Clinical Impression     ED Diagnosis   1. Fever of unknown origin            Disposition        Admit 12/31/2024  3:46 PM  Telemetry Bed?: No  Admitting Physician: PARADISE KINCAID [[id number]]  Is this a telephone or verbal order?: This is a telephone order from the admitting physician         Michell Bryant MD  12/31/24 1953       Michell Bryant MD  12/31/24 1954     family

## 2025-03-20 NOTE — PHYSICAL THERAPY INITIAL EVALUATION ADULT - SIT-TO-STAND BALANCE
Patient returned call and she has been rescheduled for 6/26 at 12 PM with Dr. Dodson to coordinate with her work schedule.    poor balance